# Patient Record
Sex: FEMALE | Race: BLACK OR AFRICAN AMERICAN | Employment: PART TIME | URBAN - METROPOLITAN AREA
[De-identification: names, ages, dates, MRNs, and addresses within clinical notes are randomized per-mention and may not be internally consistent; named-entity substitution may affect disease eponyms.]

---

## 2017-05-10 ENCOUNTER — OFFICE VISIT (OUTPATIENT)
Dept: INTERNAL MEDICINE CLINIC | Age: 57
End: 2017-05-10

## 2017-05-10 VITALS
HEIGHT: 69 IN | BODY MASS INDEX: 23.7 KG/M2 | RESPIRATION RATE: 16 BRPM | WEIGHT: 160 LBS | TEMPERATURE: 98.6 F | HEART RATE: 62 BPM | DIASTOLIC BLOOD PRESSURE: 89 MMHG | OXYGEN SATURATION: 100 % | SYSTOLIC BLOOD PRESSURE: 151 MMHG

## 2017-05-10 DIAGNOSIS — M22.2X2 PATELLOFEMORAL ARTHRALGIA OF BOTH KNEES: Primary | ICD-10-CM

## 2017-05-10 DIAGNOSIS — M22.2X1 PATELLOFEMORAL ARTHRALGIA OF BOTH KNEES: Primary | ICD-10-CM

## 2017-05-10 NOTE — PROGRESS NOTES
Chief Complaint   Patient presents with    Knee Pain     F/u     she is a 60y.o. year old female who presents for follow up of bilateral knee pain. Patient was in office 2/23/16 and since symptoms have improved. PT is still have pain to anterior knee that radiates to the posterior side. Patient currently not taking medication. Follow Up Pain Assessment Encounter      Onset of Symptoms: couple years   ________________________________________________________________________  Description: Pain is now 50% has slightly improved      Pain Scale:(1-10): 5  Duration:  intermittent  What makes it better?: rest  What makes it worse?:walking  Medications tried: ibuprofen  Modalities tried: Exercises        Reviewed and agree with Nurse Note and duplicated in this note. Reviewed PmHx, RxHx, FmHx, SocHx, AllgHx and updated and dated in the chart.     Family History   Problem Relation Age of Onset    Alcohol abuse Father     Liver Disease Father      cirrhosis    Stroke Maternal Grandmother     Prostate Cancer Maternal Grandfather     Heart Disease Mother      Has Debrillator    Hypertension Mother     Pacemaker Mother     Anesth Problems Neg Hx        Past Medical History:   Diagnosis Date    Adult acne     Arthritis     History of colonic polyps     2014 study normal, f/u 5 years Saw [facility])    Hypertension     HISTORY NOT TAKING ANY MEDS CURRENTLY    Right inguinal hernia 3/16/2016    Wears dentures       Social History     Social History    Marital status: SINGLE     Spouse name: N/A    Number of children: N/A    Years of education: N/A     Occupational History   [employer]      Social History Main Topics    Smoking status: Former Smoker     Packs/day: 0.00     Years: 4.00     Types: Cigarettes     Quit date: 1/1/1991    Smokeless tobacco: Never Used      Comment: SMOKED 1 CIGARRETTE DAILY FOR 4 YEARS    Alcohol use No    Drug use: No    Sexual activity: No     Other Topics Concern     Service No    Blood Transfusions No    Caffeine Concern Yes     0-1 cup daily (previously 1-2 per day)    Occupational Exposure No    Sleep Concern Yes     restless sleep    Stress Concern No    Weight Concern No    Special Diet No    Exercise Yes     walks routinely     Social History Narrative    Single, has two children (son and daughter). [facility]. Review of Systems - negative except as listed above      Objective:     Vitals:    05/10/17 1559   BP: 151/89   Pulse: 62   Resp: 16   Temp: 98.6 °F (37 °C)   TempSrc: Oral   SpO2: 100%   Weight: 160 lb (72.6 kg)   Height: 5' 9\" (1.753 m)       Physical Examination: General appearance - alert, well appearing, and in no distress  Back exam - full range of motion, no tenderness, palpable spasm or pain on motion  Neurological - alert, oriented, normal speech, no focal findings or movement disorder noted  Musculoskeletal - bilateral knee exam:  The patient'sbilateral Knee  is  normal to inspection. The ROM is normal and there is flexion to 60 Effusion is: absent The joint exhibits  absent warmth and Crepitus is: moderate. The Stanley test is:  Negative Joint Line Tenderness is  Positive laterally. The [facility] Test is not done   and the Lachman is  negative   . The Anterior Drawer is  Negative. The Posterior Drawer is:  negative. Valgus Stress (for MCL) is:  normal . Varus Stress (for LCL) is  normal  . The Jacqueline Test is negative and the Apprehension Sign:   negative  Extremities - peripheral pulses normal, no pedal edema, no clubbing or cyanosis  Skin - normal coloration and turgor, no rashes, no suspicious skin lesions noted    Assessment/ Plan:   Alice Roa was seen today for knee pain. Diagnoses and all orders for this visit:    Patellofemoral arthralgia of both knees  -     REFERRAL TO PHYSICAL THERAPY     Follow-up Disposition:  Return in about 4 weeks (around 6/7/2017) for bilateral knee pain.     Pathophysiology, recovery and rehabilitation process discussed and questions answered   Counseling for 30 Minutes of the total visit duration   Pictures and figures used as necessary   Provided reassurance   Recommend use of OTC Aleve or Advil   Discussed use and side effects of prescribed medications   Monitor response to Physical Therapy   Recommend activity modification   Recommend  lower impact activities-walking, Eliptical, Nordic Track, cycling or swimming   Follow up in 4 week(s)      I have discussed the diagnosis with the patient and the intended plan as seen in the above orders. The patient has received an after-visit summary and questions were answered concerning future plans. Medication Side Effects and Warnings were discussed with patient: yes  Patient Labs were reviewed and or requested: yes  Patient Past Records were reviewed and or requested  yes  I have discussed the diagnosis with the patient and the intended plan as seen in the above orders. The patient has received an after-visit summary and questions were answered concerning future plans. Pt agrees to call or return to clinic and/or go to closest ER with any worsening of symptoms. This may include, but not limited to increased fever (>100.4) with NSAIDS or Tylenol, increased edema, confusion, rash, worsening of presenting symptoms. Patient was informed/counseled to:    1) Remember to stay active and/or exercise regularly (I suggest 30-45 minutes daily)   2) For reliable dietary information, go to www. EATRIGHT.org. You may wish to consider seeing the nutritionist at [facility] at #[phone] or [phone], also consider the [address]   3) I routinely suggest a complete physical exam once each year (your birth month)

## 2017-05-10 NOTE — MR AVS SNAPSHOT
Visit Information Date & Time Provider Department Dept. Phone Encounter #  
 5/10/2017  3:30 PM Van Juarez MD [facility] and [facility] 34 [id number] Follow-up Instructions Return in about 4 weeks (around 6/7/2017) for bilateral knee pain. Your Appointments 5/24/2017  2:40 PM  
ROUTINE CARE with Jeannie Shin  [facility] [facility] Appt Note: FOLLOW UP VISIT -  $0CP-  $0PB-  [facility]  5-4-2017  
 [address] [place] [address]  
[phone]  
  
   
 [phone]  
  
    
 6/7/2017  3:15 PM  
Any with Van Juarez MD  
[address] [place] [address] and Primary Care [place] [facility]) Appt Note: f/u  
 [address] [facility]  
  
   
 [address] [id number] Upcoming Health Maintenance Date Due DTaP/Tdap/Td series (1 - Tdap) 3/15/2016 PAP AKA CERVICAL CYTOLOGY 7/1/2017 INFLUENZA AGE 9 TO ADULT 8/1/2017 BREAST CANCER SCRN MAMMOGRAM 11/11/2017 COLONOSCOPY 4/18/2024 Allergies as of 5/10/2017  Review Complete On: 5/10/2017 By: Van Juarez MD  
  
 Severity Noted Reaction Type Reactions Pcn [Penicillins]  11/22/2011    Hives  
 itching Current Immunizations  Reviewed on 4/6/2015 Name Date Td, Adsorbed PF 3/14/2016 Not reviewed this visit You Were Diagnosed With   
  
 Codes Comments Patellofemoral arthralgia of both knees    -  Primary ICD-10-CM: M22.2X1, M22.2X2 ICD-9-CM: 719.46 Vitals BP Pulse Temp Resp Height(growth percentile) Weight(growth percentile) 151/89 (BP 1 Location: Right arm, BP Patient Position: Sitting) 62 98.6 °F (37 °C) (Oral) 16 5' 9\" (1.753 m) 160 lb (72.6 kg) SpO2 BMI OB Status Smoking Status 100% 23.63 kg/m2 Hysterectomy Former Smoker BMI and BSA Data  Body Mass Index Body Surface Area  
 23.63 kg/m 2 1.88 m 2  
  
  
 Preferred Pharmacy Pharmacy Name Phone [facility] [address] [facility] [phone] Your Updated Medication List  
  
   
This list is accurate as of: 5/10/17  4:13 PM.  Always use your most recent med list.  
  
  
  
  
 acetaminophen-codeine 300-30 mg per tablet Commonly known as:  TYLENOL #3 Take 1 Tab by mouth every six (6) hours as needed for Pain. Max Daily Amount: 4 Tabs. benzoyl peroxide-erythromycin 3-5 % topical gel Commonly known as:  BENZAMYCIN  
APPLY TO THE AFFECTED AREA TWICE DAILY  
  
 ibuprofen 600 mg tablet Commonly known as:  MOTRIN Take 1 Tab by mouth every six (6) hours as needed for Pain.  
  
 meloxicam 7.5 mg tablet Commonly known as:  MOBIC Take 1 Tab by mouth daily. MULTIVITAMIN PO Take  by mouth daily. potassium chloride 10 mEq tablet Commonly known as:  KLOR-CON Take 1 Tab by mouth daily. TYLENOL EXTRA STRENGTH 500 mg tablet Generic drug:  acetaminophen Take  by mouth every six (6) hours as needed for Pain. We Performed the Following REFERRAL TO PHYSICAL THERAPY [SAK88 Custom] Follow-up Instructions Return in about 4 weeks (around 6/7/2017) for bilateral knee pain. Referral Information Referral ID Referred By Referred To  
  
 [id number] [facility] OP PT SRIRAM   
   [address] [place] [address], [address] Phone: [phone] Fax: [phone] Visits Status Start Date End Date  
 21 New Request 5/10/17 5/10/18 If your referral has a status of pending review or denied, additional information will be sent to support the outcome of this decision. Introducing [facility]! Mere Woodward introduces [facility] patient portal. Now you can access parts of your medical record, email your doctor's office, and request medication refills online.    
 
1. In your internet browser, go to [email or web address] 2. Click on the First Time User? Click Here link in the Sign In box. You will see the New Member Sign Up page. 3. Enter your CodeSquare Access Code exactly as it appears below. You will not need to use this code after youve completed the sign-up process. If you do not sign up before the expiration date, you must request a new code. · CodeSquare Access Code: [email or web address] Expires: 8/8/2017  4:11 PM 
 
4. Enter the last four digits of your Social Security Number (xxxx) and Date of Birth (mm/dd/yyyy) as indicated and click Submit. You will be taken to the next sign-up page. 5. Create a Utterzt ID. This will be your CodeSquare login ID and cannot be changed, so think of one that is secure and easy to remember. 6. Create a CodeSquare password. You can change your password at any time. 7. Enter your Password Reset Question and Answer. This can be used at a later time if you forget your password. 8. Enter your e-mail address. You will receive e-mail notification when new information is available in [address] 9. Click Sign Up. You can now view and download portions of your medical record. 10. Click the Download Summary menu link to download a portable copy of your medical information. If you have questions, please visit the Frequently Asked Questions section of the CodeSquare website. Remember, CodeSquare is NOT to be used for urgent needs. For medical emergencies, dial 911. Now available from your iPhone and Android! Please provide this summary of care documentation to your next provider. Your primary care clinician is listed as Evon Nguyễn. If you have any questions after today's visit, please call [phone].

## 2017-05-15 ENCOUNTER — APPOINTMENT (OUTPATIENT)
Dept: PHYSICAL THERAPY | Age: 57
End: 2017-05-15

## 2017-05-31 ENCOUNTER — HOSPITAL ENCOUNTER (OUTPATIENT)
Dept: PHYSICAL THERAPY | Age: 57
Discharge: HOME | End: 2017-05-31
Payer: SUBSIDIZED

## 2017-05-31 PROCEDURE — 97110 THERAPEUTIC EXERCISES: CPT | Performed by: PHYSICAL THERAPIST

## 2017-05-31 PROCEDURE — 97161 PT EVAL LOW COMPLEX 20 MIN: CPT | Performed by: PHYSICAL THERAPIST

## 2017-05-31 NOTE — PROGRESS NOTES
PT INITIAL EVALUATION NOTE 2-15    Patient Name: Sarah Oates  Date:2017  : 1960  [x]  Patient  Verified  Payor: Woody Carpenter / Plan: [facility] % / Product Type: Xiomara /    In time:3:40p  Out time:4:30p  Total Treatment Time (min): 50  Visit #: 1    Treatment Area: Pain in right knee [M25.561]    SUBJECTIVE  Pain Level (0-10 scale): 8/10  Any medication changes, allergies to medications, adverse drug reactions, diagnosis change, or new procedure performed?: [] No    [x] Yes (see summary sheet for update)  Subjective: The pt presents with bilateral knee pain that is constant in nature and reported at 8/10 pain. Pain has been going on for 5 years. They ache and he had to drain knees last year. The pt has not been to PT. Outside walking is exercise and walks daily. The pt can walk 5-6 blocks at this time. Gets stiff in the morning or at the end of the day. Stairs are challengeing but feels like they are exercising her knee. PLOF: activities  Mechanism of Injury: gradual onset  Previous Treatment/Compliance: fluid removal  PMHx/Surgical Hx: HTN  Work Hx:not currently working  Living Situation:  Steps at home. Lives with family  Pt Goals: decreased pain  Barriers: chronicity of pain  Motivation: high  Substance use: none  FABQ Score:26  Cognition: A & O x4        OBJECTIVE/EXAMINATION      Gait and Functional Mobility: mild Antalgic gait   Palpation: Decreased PFJ mobility with crepitus R > L     Knee ROM:   R   L   Flexion   132   130   Extension  0   0        Joint Mobility Assessment: Restricted PFJ mobility noted, lateral tilt and tightness in ITB. Flexibility:Decreased gastroc, HS and quad flexibility. ITB tightness bilaterally.        LOWER QUARTER   MUSCLE STRENGTH  KEY       R  L  0 - No Contraction  Knee ext  4  4  1 - Trace            flex  4  4  2 - Poor   Hip ext   -  -  3 - Fair          flex   4  4  4 - Good         abd  4  4  5 - Normal         add  4  4        Special Tests: Trendelenberg: positive   Stork: n/t      Naman: positive    Jacqueline: postive                 H.S. SLR: positive                 Modality rationale: decrease inflammation and decrease pain to improve the patients ability to progress daily activities   Min Type Additional Details    [] Estim: []Att   []Unatt        []TENS instruct                  []IFC  []Premod   []NMES                     []Other:  []w/US   []w/ice   []w/heat  Position:  Location:    []  Traction: [] Cervical       []Lumbar                       [] Prone          []Supine                       []Intermittent   []Continuous Lbs:  [] before manual  [] after manual  []w/heat    []  Ultrasound: []Continuous   [] Pulsed at:                            []1MHz   []3MHz Location:  W/cm2:    []  Paraffin         Location:  []w/heat   10 [x]  Ice     []  Heat  []  Ice massage Position: sitting  Location: knee bilateral    []  Laser  []  Other: Position:  Location:    []  Vasopneumatic Device Pressure:       [] lo [] med [] hi   Temperature:    [x] Skin assessment post-treatment:  [x]intact []redness- no adverse reaction    []redness  adverse reaction:     15 min Therapeutic Exercise:  [x] See flow sheet : quad sets, SLR flexion with assistance, calf, HS stretching and heel slides. Rationale: increase ROM, increase strength, improve coordination, improve balance and increase proprioception to improve the patients ability to daily activity tolerance.              With   [] TE   [] TA   [] neuro   [] other: Patient Education: [x] Review HEP    [] Progressed/Changed HEP based on:   [] positioning   [] body mechanics   [] transfers   [] heat/ice application    [] other:        Other Objective/Functional Measures: FOTO 55  Pain Level (0-10 scale) post treatment: 5/10      ASSESSMENT:      [x]  See Plan of Alex BEAN Matias Huntley 5/31/2017  3:39 PM

## 2017-05-31 NOTE — PROGRESS NOTES
[facility]  [address] [address] [place], [address] [place] [address]  Phone: [phone]  Fax: [phone]    Plan of Care/Statement of Necessity for Physical Therapy Services  2-15    Patient name: Rebecca Hickey  : 1960  Provider#: [id number]  Referral source: Bennett Li MD      Medical/Treatment Diagnosis: Pain in right knee [M25.561]     Prior Hospitalization: see medical history     Comorbidities: HTN, OA  Prior Level of Function: walking daily 5-6 blocks  Medications: Verified on Patient Summary List    Start of Care:17      Onset Date: 5 years +      The Plan of Care and following information is based on the information from the initial evaluation. Assessment/ key information: The pt is a 60 y.o. Female referred for the evaluation and treatment of bilateral PFJ arthritis and knee pain. The pt presents with s/s consistent with referring dx with decreased strength, flexibility, balance and proprioception. She has delayed quad firing and tightness in her ITB that contributes to PFJ tracking and pain. The pt would benefit from skilled physical therapy in order to address these impairments and to return her  to maximal level of function pain free.      Evaluation Complexity History MEDIUM  Complexity : 1-2 comorbidities / personal factors will impact the outcome/ POC ; Examination MEDIUM Complexity : 3 Standardized tests and measures addressing body structure, function, activity limitation and / or participation in recreation  ;Presentation LOW Complexity : Stable, uncomplicated  ;Clinical Decision Making MEDIUM Complexity : FOTO score of 26-74  Overall Complexity Rating: LOW     Problem List: pain affecting function, decrease ROM, decrease strength, edema affecting function, impaired gait/ balance, decrease ADL/ functional abilitiies, decrease activity tolerance and decrease flexibility/ joint mobility   Treatment Plan may include any combination of the following: Therapeutic exercise, Therapeutic activities, Neuromuscular re-education, Physical agent/modality, Gait/balance training, Manual therapy, Patient education and Functional mobility training  Patient / Family readiness to learn indicated by: asking questions, trying to perform skills and interest  Persons(s) to be included in education: patient (P)  Barriers to Learning/Limitations: None  Patient Goal (s): decrease pain in knees  Patient Self Reported Health Status: good  Rehabilitation Potential: good    Short Term Goals: To be accomplished in 2 weeks:  1)Pt will be Independent with HEP. 2) Pt will be able to complete 10 SLR into flexion. Long Term Goals: To be accomplished in 6-8 weeks:  1)Pt will be able to Navigate one flight of stairs without pain. 2) Pt will be able to Stand for >/= 20 minutes without pain. 3) Pt will be able to Ambulate >/= 10 blocks   4) Pt will be able to perform 20 SLR into flexion    Frequency / Duration: Patient to be seen 2 times per week for 6-8 weeks. Patient/ Caregiver education and instruction: activity modification and exercises    [x]  Plan of care has been reviewed with ARNALDO Dee 5/31/2017 6:04 PM    ________________________________________________________________________    I certify that the above Therapy Services are being furnished while the patient is under my care. I agree with the treatment plan and certify that this therapy is necessary.     [de-identified] Signature:____________________  Date:____________Time: _________

## 2017-06-06 ENCOUNTER — APPOINTMENT (OUTPATIENT)
Dept: PHYSICAL THERAPY | Age: 57
End: 2017-06-06
Payer: SUBSIDIZED

## 2017-06-07 ENCOUNTER — HOSPITAL ENCOUNTER (OUTPATIENT)
Dept: PHYSICAL THERAPY | Age: 57
Discharge: HOME | End: 2017-06-07
Payer: SUBSIDIZED

## 2017-06-07 PROCEDURE — 97110 THERAPEUTIC EXERCISES: CPT

## 2017-06-07 PROCEDURE — 97140 MANUAL THERAPY 1/> REGIONS: CPT

## 2017-06-07 NOTE — PROGRESS NOTES
PT DAILY TREATMENT NOTE - [facility] [address]    Patient Name: Epifanio George  Date:2017  : 1960  [x]  Patient  Verified  Payor: Susana Min / Plan: [facility] % / Product Type: [employer] /    In time: 5:55P  Out time:6:55P  Total Treatment Time (min): 60  Total Timed Codes (min): 50  1:1 Treatment Time ( only): --   Visit #: 2     Treatment Area: Pain in right knee [M25.561]    SUBJECTIVE  Pain Level (0-10 scale): 7/10  Any medication changes, allergies to medications, adverse drug reactions, diagnosis change, or new procedure performed?: [x] No    [] Yes (see summary sheet for update)  Subjective functional status/changes:   [] No changes reported  \"I am about the same since I was here last. I have been doing my HEP.     OBJECTIVE    Modality rationale: decrease edema, decrease inflammation and decrease pain to improve the patients ability to decrease B knee swelling   Min Type Additional Details    [] Estim: []Att   []Unatt        []TENS instruct                  []IFC  []Premod   []NMES                     []Other:  []w/US   []w/ice   []w/heat  Position:  Location:    []  Traction: [] Cervical       []Lumbar                       [] Prone          []Supine                       []Intermittent   []Continuous Lbs:  [] before manual  [] after manual  []w/heat    []  Ultrasound: []Continuous   [] Pulsed at:                           []1MHz   []3MHz Location:  W/cm2:    [] Paraffin         Location:   []w/heat   10 post [x]  Ice     []  Heat  []  Ice massage Position: supine with bolster  Location: B knees    []  Laser  []  Other: Position:  Location:      []  Vasopneumatic Device Pressure:       [] lo [] med [] hi   Temperature:      [x] Skin assessment post-treatment:  [x]intact []redness- no adverse reaction    []redness  adverse reaction:     35 min Therapeutic Exercise:  [x] See flow sheet :   Rationale: increase ROM, increase strength and improve coordination to improve the patients ability to increase ability for ambulation    15 min Manual Therapy: B patella mobs inf/sup, FR B ITB    Rationale: decrease pain, increase ROM, increase tissue extensibility and decrease trigger points to improve the patients ability to increase knee mobility          With   [] TE   [] TA   [] neuro   [] other: Patient Education: [x] Review HEP    [] Progressed/Changed HEP based on:   [] positioning   [] body mechanics   [] transfers   [] heat/ice application    [] other:      Other Objective/Functional Measures: --     Pain Level (0-10 scale) post treatment: 5/10      ASSESSMENT/Changes in Function:   Pt tolerated increase in strengthening without increase in B knee pain. However, pt reported increase fatigue with strengthening exercises. Continue to progress as tolerated. Patient will continue to benefit from skilled PT services to modify and progress therapeutic interventions, address functional mobility deficits, address ROM deficits, address strength deficits, analyze and address soft tissue restrictions, analyze and cue movement patterns and analyze and modify body mechanics/ergonomics to attain remaining goals. [x]  See Plan of Care  []  See progress note/recertification  []  See Discharge Summary         Progress towards goals / Updated goals:  Short Term Goals: To be accomplished in 2 weeks:  1)Pt will be Independent with HEP. 2) Pt will be able to complete 10 SLR into flexion.      Long Term Goals: To be accomplished in 6-8 weeks:  1)Pt will be able to Navigate one flight of stairs without pain. 2) Pt will be able to Stand for >/= 20 minutes without pain.    3) Pt will be able to Ambulate >/= 10 blocks   4) Pt will be able to perform 20 SLR into flexion    PLAN  [x]  Upgrade activities as tolerated     [x]  Continue plan of care  []  Update interventions per flow sheet       []  Discharge due to:_  []  Other:_      Claudia Morel PTA 6/7/2017  6:00 PM

## 2017-06-13 ENCOUNTER — APPOINTMENT (OUTPATIENT)
Dept: PHYSICAL THERAPY | Age: 57
End: 2017-06-13
Payer: SUBSIDIZED

## 2017-06-19 ENCOUNTER — HOSPITAL ENCOUNTER (OUTPATIENT)
Dept: PHYSICAL THERAPY | Age: 57
Discharge: HOME | End: 2017-06-19
Payer: SUBSIDIZED

## 2017-06-19 PROCEDURE — 97110 THERAPEUTIC EXERCISES: CPT

## 2017-06-19 NOTE — PROGRESS NOTES
PT DAILY TREATMENT NOTE - [facility] 2-15    Patient Name: Mane Peace  Date:2017  : 1960  [x]  Patient  Verified  Payor: Levar Pedroza / Plan: [employer] % / Product Type: [employer] /    In time: 5:25P  Out time: 6:25P  Total Treatment Time (min): 60  Total Timed Codes (min): 50  1:1 Treatment Time ( only): --   Visit #: 3     Treatment Area: Pain in right knee [M25.561]    SUBJECTIVE  Pain Level (0-10 scale): 0/10  Any medication changes, allergies to medications, adverse drug reactions, diagnosis change, or new procedure performed?: [x] No    [] Yes (see summary sheet for update)  Subjective functional status/changes:   [] No changes reported  \"I am having no pain in either knee. I am finding that doing the things to provoke pain are not as bad as they used to be like climbing stairs. \"    OBJECTIVE    Modality rationale: decrease edema, decrease inflammation and decrease pain to improve the patients ability to decrease B knee swelling   Min Type Additional Details    [] Estim: []Att   []Unatt        []TENS instruct                  []IFC  []Premod   []NMES                     []Other:  []w/US   []w/ice   []w/heat  Position:  Location:    []  Traction: [] Cervical       []Lumbar                       [] Prone          []Supine                       []Intermittent   []Continuous Lbs:  [] before manual  [] after manual  []w/heat    []  Ultrasound: []Continuous   [] Pulsed at:                           []1MHz   []3MHz Location:  W/cm2:    [] Paraffin         Location:   []w/heat   10 post [x]  Ice     []  Heat  []  Ice massage Position: supine with bolster  Location: B knees    []  Laser  []  Other: Position:  Location:      []  Vasopneumatic Device Pressure:       [] lo [] med [] hi   Temperature:      [x] Skin assessment post-treatment:  [x]intact []redness- no adverse reaction    []redness  adverse reaction:     50 min Therapeutic Exercise:  [x] See flow sheet :   Rationale: increase ROM, increase strength and improve coordination to improve the patients ability to increase ability for ambulation    With   [] TE   [] TA   [] neuro   [] other: Patient Education: [x] Review HEP    [] Progressed/Changed HEP based on:   [] positioning   [] body mechanics   [] transfers   [] heat/ice application    [] other:      Other Objective/Functional Measures: --     Pain Level (0-10 scale) post treatment: 0/10      ASSESSMENT/Changes in Function:   Pt is making progress towards STG and LTG set forth by the PT. Held on manual therapy today secondary of pt not having any pain in either knee. Continue to progress quad and xin strengthening need for daily activities  Patient will continue to benefit from skilled PT services to modify and progress therapeutic interventions, address functional mobility deficits, address ROM deficits, address strength deficits, analyze and address soft tissue restrictions, analyze and cue movement patterns and analyze and modify body mechanics/ergonomics to attain remaining goals. [x]  See Plan of Care  []  See progress note/recertification  []  See Discharge Summary         Progress towards goals / Updated goals:  Short Term Goals: To be accomplished in 2 weeks:  1)Pt will be Independent with HEP. MET  2) Pt will be able to complete 10 SLR into flexion.      Long Term Goals: To be accomplished in 6-8 weeks:  1)Pt will be able to Navigate one flight of stairs without pain. Progressing  2) Pt will be able to Stand for >/= 20 minutes without pain.   Progressing  3) Pt will be able to Ambulate >/= 10 blocks  Progressing  4) Pt will be able to perform 20 SLR into flexion    PLAN  [x]  Upgrade activities as tolerated     [x]  Continue plan of care  []  Update interventions per flow sheet       []  Discharge due to:_  []  Other:_      Rosa Elena Griffin, ARNALDO 6/19/2017  6:00 PM

## 2017-06-20 ENCOUNTER — APPOINTMENT (OUTPATIENT)
Dept: PHYSICAL THERAPY | Age: 57
End: 2017-06-20
Payer: SUBSIDIZED

## 2017-06-21 ENCOUNTER — HOSPITAL ENCOUNTER (OUTPATIENT)
Dept: PHYSICAL THERAPY | Age: 57
Discharge: HOME | End: 2017-06-21
Payer: SUBSIDIZED

## 2017-06-21 PROCEDURE — 97110 THERAPEUTIC EXERCISES: CPT | Performed by: PHYSICAL THERAPIST

## 2017-06-21 PROCEDURE — 97140 MANUAL THERAPY 1/> REGIONS: CPT | Performed by: PHYSICAL THERAPIST

## 2017-06-21 NOTE — PROGRESS NOTES
PT DAILY TREATMENT NOTE 2-15    Patient Name: Chris Holcomb  Date:2017  : 1960  [x]  Patient  Verified  Payor: Sahra Paty / Plan: [employer] % / Product Type: [id number] /    In time: 5:10p Out time:6:20p  Total Treatment Time (min): 70  Visit #: 4    Treatment Area: Pain in right knee [M25.561]    SUBJECTIVE  Pain Level (0-10 scale): 6/10  Any medication changes, allergies to medications, adverse drug reactions, diagnosis change, or new procedure performed?: [x] No    [] Yes (see summary sheet for update)  Subjective functional status/changes:   [] No changes reported  Left knee is feeling more  Sore today.      OBJECTIVE           Modality rationale: decrease edema, decrease inflammation and decrease pain to improve the patients ability to decrease B knee swelling   Min Type Additional Details     [] Estim: []Att   []Unatt        []TENS instruct                  []IFC  []Premod   []NMES                     []Other:  []w/US   []w/ice   []w/heat  Position:  Location:     []  Traction: [] Cervical       []Lumbar                       [] Prone          []Supine                       []Intermittent   []Continuous Lbs:  [] before manual  [] after manual  []w/heat     []  Ultrasound: []Continuous   [] Pulsed at:                           []1MHz   []3MHz Location:  W/cm2:     [] Paraffin      Location:   []w/heat   10 post [x]  Ice     []  Heat  []  Ice massage Position: supine with bolster  Location: B knees     []  Laser  []  Other: Position:  Location:     []  Vasopneumatic Device Pressure:       [] lo [] med [] hi   Temperature:       [x] Skin assessment post-treatment:  [x]intact []redness- no adverse reaction    []redness  adverse reaction:      50 min Therapeutic Exercise:  [x] See flow sheet :   Rationale: increase ROM, increase strength and improve coordination to improve the patients ability to increase ability for ambulation      10 min Manual Therapy: B patella mobs inf/sup, FR B ITB Rationale: decrease pain, increase ROM, increase tissue extensibility and decrease trigger points to improve the patients ability to increase knee mobility        With   [] TE   [] TA   [] neuro   [] other: Patient Education: [x] Review HEP    [] Progressed/Changed HEP based on:   [] positioning   [] body mechanics   [] transfers   [] heat/ice application    [] other:       Other Objective/Functional Measures: --                  Pain Level (0-10 scale) post treatment: 0/10       ASSESSMENT/Changes in Function:   Pt is progressing with exercises and able to add SLS and heel raises. Progressed with TKE to cable column. She had more pain in her left knee walking into PT today. Performed PFJ mobilizations and ITB release. She was tighter on the left knee today with greater amount of pain. Patient will continue to benefit from skilled PT services to modify and progress therapeutic interventions, address functional mobility deficits, address ROM deficits, address strength deficits, analyze and address soft tissue restrictions, analyze and cue movement patterns and analyze and modify body mechanics/ergonomics to attain remaining goals.      [x]  See Plan of Care  []  See progress note/recertification  []  See Discharge Summary      Progress towards goals / Updated goals:  Short Term Goals: To be accomplished in 2 weeks:  1)Pt will be Independent with HEP. MET  2) Pt will be able to complete 10 SLR into flexion.       Long Term Goals: To be accomplished in 6-8 weeks:  1)Pt will be able to Navigate one flight of stairs without pain. Progressing  2) Pt will be able to Stand for >/= 20 minutes without pain.   Progressing  3) Pt will be able to Ambulate >/= 10 blocks  Progressing  4) Pt will be able to perform 20 SLR into flexion     PLAN  [x]  Upgrade activities as tolerated     [x]  Continue plan of care  []  Update interventions per flow sheet       []  Discharge due to:_  []  Other:_      Santosh Long 6/21/2017  5:47 PM

## 2017-06-23 ENCOUNTER — APPOINTMENT (OUTPATIENT)
Dept: PHYSICAL THERAPY | Age: 57
End: 2017-06-23
Payer: SUBSIDIZED

## 2017-06-26 ENCOUNTER — HOSPITAL ENCOUNTER (OUTPATIENT)
Dept: PHYSICAL THERAPY | Age: 57
Discharge: HOME | End: 2017-06-26
Payer: SUBSIDIZED

## 2017-06-26 PROCEDURE — 97140 MANUAL THERAPY 1/> REGIONS: CPT

## 2017-06-26 PROCEDURE — 97110 THERAPEUTIC EXERCISES: CPT

## 2017-06-26 NOTE — PROGRESS NOTES
PT DAILY TREATMENT NOTE - [facility] 2-15    Patient Name: Shirin Draper  Date:2017  : 1960  [x]  Patient  Verified  Payor: Liv Saldana / Plan: [facility] % / Product Type: [place] /    In time:5:30P  Out time:6:35P  Total Treatment Time (min): 65  Total Timed Codes (min): 65  1:1 Treatment Time ( only): --   Visit #: 5     Treatment Area: Pain in right knee [M25.561]    SUBJECTIVE  Pain Level (0-10 scale): 0/10  Any medication changes, allergies to medications, adverse drug reactions, diagnosis change, or new procedure performed?: [x] No    [] Yes (see summary sheet for update)  Subjective functional status/changes:   [] No changes reported  \"I am feeling good today. The soreness I had last time is gone. \"    OBJECTIVE    50 min Therapeutic Exercise:  [x] See flow sheet :   Rationale: increase ROM, increase strength and improve coordination to improve the patients ability for ambulation    15 min Manual Therapy: B patella mobs inf/sup, FR B ITB     Rationale: decrease pain, increase ROM, increase tissue extensibility and decrease trigger points to improve the patients ability to increase knee mobility    With   [] TE   [] TA   [] neuro   [] other: Patient Education: [x] Review HEP    [] Progressed/Changed HEP based on:   [] positioning   [] body mechanics   [] transfers   [] heat/ice application    [] other:      Other Objective/Functional Measures: --     Pain Level (0-10 scale) post treatment: 0/10    ASSESSMENT/Changes in Function:   Able to advance strengthening exercises today without increase in B knee pain. Continue to progress as tolerated. Patient will continue to benefit from skilled PT services to modify and progress therapeutic interventions, address functional mobility deficits, address ROM deficits, address strength deficits, analyze and address soft tissue restrictions and analyze and cue movement patterns to attain remaining goals.      [x]  See Plan of Care  []  See progress note/recertification  []  See Discharge Summary         Progress towards goals / Updated goals:  Short Term Goals: To be accomplished in 2 weeks:  1)Pt will be Independent with HEP.  MET  2) Pt will be able to complete 10 SLR into flexion.       Long Term Goals: To be accomplished in 6-8 weeks:  1)Pt will be able to Navigate one flight of stairs without pain.  Progressing  2) Pt will be able to Stand for >/= 20 minutes without pain.  Progressing  3) Pt will be able to Ambulate >/= 10 blocks  Progressing  4) Pt will be able to perform 20 SLR into flexion    PLAN  [x]  Upgrade activities as tolerated     [x]  Continue plan of care  []  Update interventions per flow sheet       []  Discharge due to:_  []  Other:_      Mee Lofton PTA 6/26/2017  5:36 PM

## 2017-06-27 ENCOUNTER — APPOINTMENT (OUTPATIENT)
Dept: PHYSICAL THERAPY | Age: 57
End: 2017-06-27
Payer: SUBSIDIZED

## 2017-06-28 ENCOUNTER — HOSPITAL ENCOUNTER (OUTPATIENT)
Dept: PHYSICAL THERAPY | Age: 57
Discharge: HOME | End: 2017-06-28
Payer: SUBSIDIZED

## 2017-06-28 PROCEDURE — 97110 THERAPEUTIC EXERCISES: CPT | Performed by: PHYSICAL THERAPIST

## 2017-06-28 PROCEDURE — 97140 MANUAL THERAPY 1/> REGIONS: CPT | Performed by: PHYSICAL THERAPIST

## 2017-06-28 NOTE — PROGRESS NOTES
PT DAILY TREATMENT NOTE 2-15    Patient Name: Alvaro Carlson  Date:2017  : 1960  [x]  Patient  Verified  Payor: Josh Cook / Plan: [facility] % / Product Type: [id number] /    In time: 5:00p  Out time: 6:00p  Total Treatment Time (min): 60  Visit #: 6    Treatment Area: Pain in right knee [M25.561]    SUBJECTIVE  Pain Level (0-10 scale): 0/10  Any medication changes, allergies to medications, adverse drug reactions, diagnosis change, or new procedure performed?: [x] No    [] Yes (see summary sheet for update)  Subjective functional status/changes:   [] No changes reported  Doing well, no increased pain. OBJECTIVE     50 min Therapeutic Exercise:  [x] See flow sheet :   Rationale: increase ROM, increase strength and improve coordination to improve the patients ability for ambulation     10 min Manual Therapy: B patella mobs inf/sup, STM B ITB     Rationale: decrease pain, increase ROM, increase tissue extensibility and decrease trigger points to improve the patients ability to increase knee mobility     With   [] TE   [] TA   [] neuro   [] other: Patient Education: [x] Review HEP    [] Progressed/Changed HEP based on:   [] positioning   [] body mechanics   [] transfers   [] heat/ice application    [] other:       Other Objective/Functional Measures: --                  Pain Level (0-10 scale) post treatment: 0/10     ASSESSMENT/Changes in Function:   Pt feeling good with her progress. Still had palpable tightness sin distal ITB, but overall feeling much improved.    Patient will continue to benefit from skilled PT services to modify and progress therapeutic interventions, address functional mobility deficits, address ROM deficits, address strength deficits, analyze and address soft tissue restrictions and analyze and cue movement patterns to attain remaining goals.      [x]  See Plan of Care  []  See progress note/recertification  []  See Discharge Summary      Progress towards goals / Updated goals:  Short Term Goals: To be accomplished in 2 weeks:  1)Pt will be Independent with HEP.  MET  2) Pt will be able to complete 10 SLR into flexion.       Long Term Goals: To be accomplished in 6-8 weeks:  1)Pt will be able to Navigate one flight of stairs without pain.  Progressing  2) Pt will be able to Stand for >/= 20 minutes without pain.  Progressing  3) Pt will be able to Ambulate >/= 10 blocks  Progressing  4) Pt will be able to perform 20 SLR into flexion     PLAN  [x]  Upgrade activities as tolerated     [x]  Continue plan of care  []  Update interventions per flow sheet       []  Discharge due to:_    Davin Daily 6/28/2017  6:33 PM

## 2017-06-30 ENCOUNTER — APPOINTMENT (OUTPATIENT)
Dept: PHYSICAL THERAPY | Age: 57
End: 2017-06-30
Payer: SUBSIDIZED

## 2017-07-06 ENCOUNTER — APPOINTMENT (OUTPATIENT)
Dept: PHYSICAL THERAPY | Age: 57
End: 2017-07-06
Payer: SUBSIDIZED

## 2017-07-11 ENCOUNTER — HOSPITAL ENCOUNTER (OUTPATIENT)
Dept: PHYSICAL THERAPY | Age: 57
Discharge: HOME | End: 2017-07-11
Payer: SUBSIDIZED

## 2017-07-11 PROCEDURE — 97110 THERAPEUTIC EXERCISES: CPT

## 2017-07-11 NOTE — PROGRESS NOTES
PT DAILY TREATMENT NOTE - [facility] 2-15    Patient Name: Shar Pickett  Date:2017  : 1960  [x]  Patient  Verified  Payor: Liz Pastures / [facility] / Product Type: Heydi Beers /    In time: 3:30P  Out time: 4:10P  Total Treatment Time (min): 40  Total Timed Codes (min): 40  1:1 Treatment Time ( only): --   Visit #: 7     Treatment Area: Pain in right knee [M25.561]    SUBJECTIVE  Pain Level (0-10 scale): 0/10  Any medication changes, allergies to medications, adverse drug reactions, diagnosis change, or new procedure performed?: [x] No    [] Yes (see summary sheet for update)  Subjective functional status/changes:   [] No changes reported  \"I have been feeling good. My biggest complaint. OBJECTIVE    40 min Therapeutic Exercise:  [x] See flow sheet :   Rationale: increase ROM, increase strength and improve coordination to improve the patients ability to tolerate daily activities. With   [] TE   [] TA   [] neuro   [] other: Patient Education: [x] Review HEP    [] Progressed/Changed HEP based on:   [] positioning   [] body mechanics   [] transfers   [] heat/ice application    [] other:      Other Objective/Functional Measures: --     Pain Level (0-10 scale) post treatment: 0/10    ASSESSMENT/Changes in Function:   Pt stated she felt good and did not feel she needed manual or ice today. Will reassess on Thursday. Patient will continue to benefit from skilled PT services to modify and progress therapeutic interventions, address functional mobility deficits, address ROM deficits, address strength deficits, analyze and address soft tissue restrictions, analyze and cue movement patterns and analyze and modify body mechanics/ergonomics to attain remaining goals.      [x]  See Plan of Care  []  See progress note/recertification  []  See Discharge Summary         Progress towards goals / Updated goals:  Short Term Goals: To be accomplished in 2 weeks:  1)Pt will be Independent with HEP.  MET  2) Pt will be able to complete 10 SLR into flexion.       Long Term Goals: To be accomplished in 6-8 weeks:  1)Pt will be able to Navigate one flight of stairs without pain.  Progressing  2) Pt will be able to Stand for >/= 20 minutes without pain.  Progressing  3) Pt will be able to Ambulate >/= 10 blocks  Progressing  4) Pt will be able to perform 20 SLR into flexion    PLAN  [x]  Upgrade activities as tolerated     [x]  Continue plan of care  []  Update interventions per flow sheet       []  Discharge due to:_  []  Other:_      Blaze Mora PTA 7/11/2017  3:45 PM

## 2017-07-13 ENCOUNTER — APPOINTMENT (OUTPATIENT)
Dept: PHYSICAL THERAPY | Age: 57
End: 2017-07-13
Payer: SUBSIDIZED

## 2017-07-20 ENCOUNTER — APPOINTMENT (OUTPATIENT)
Dept: PHYSICAL THERAPY | Age: 57
End: 2017-07-20
Payer: SUBSIDIZED

## 2017-07-25 ENCOUNTER — APPOINTMENT (OUTPATIENT)
Dept: PHYSICAL THERAPY | Age: 57
End: 2017-07-25
Payer: SUBSIDIZED

## 2017-07-27 ENCOUNTER — OFFICE VISIT (OUTPATIENT)
Dept: INTERNAL MEDICINE CLINIC | Age: 57
End: 2017-07-27

## 2017-07-27 VITALS
DIASTOLIC BLOOD PRESSURE: 81 MMHG | WEIGHT: 155 LBS | OXYGEN SATURATION: 99 % | SYSTOLIC BLOOD PRESSURE: 131 MMHG | RESPIRATION RATE: 14 BRPM | HEART RATE: 57 BPM | HEIGHT: 69 IN | BODY MASS INDEX: 22.96 KG/M2

## 2017-07-27 DIAGNOSIS — M17.10 PATELLOFEMORAL ARTHRITIS: Primary | ICD-10-CM

## 2017-07-27 RX ORDER — TRIAMCINOLONE ACETONIDE 40 MG/ML
40 INJECTION, SUSPENSION INTRA-ARTICULAR; INTRAMUSCULAR ONCE
Qty: 1 ML | Refills: 0
Start: 2017-07-27 | End: 2017-07-27

## 2017-07-27 NOTE — PROGRESS NOTES
Chief Complaint   Patient presents with    Knee Pain     Bilateral      she is a 59y.o. year old female who presents for follow up of bilateral knee pain. States she feel that the pain has improved, but still experiences a continuous dull achy pain in both knees. Feels pain has improved with physical therapy and home exercises recommended per physical therapist.      Follow Up Pain Assessment Encounter      Onset of Symptoms: couple of years  ________________________________________________________________________  Description: Pain is now 60% has slightly improved      Pain Scale:(1-10): 5  Duration:  Continuous, consistent dull achy pain  What makes it better?: ice and rest  What makes it worse?:None  Medications tried: acetaminophen, ibuprofen  Modalities tried: Physical therapy and home exercises        Reviewed and agree with Nurse Note and duplicated in this note. Reviewed PmHx, RxHx, FmHx, SocHx, AllgHx and updated and dated in the chart.     Family History   Problem Relation Age of Onset    Alcohol abuse Father     Liver Disease Father      cirrhosis    Stroke Maternal Grandmother     Prostate Cancer Maternal Grandfather     Heart Disease Mother      Has Debrillator    Hypertension Mother     Pacemaker Mother     Anesth Problems Neg Hx        Past Medical History:   Diagnosis Date    Adult acne     Arthritis     History of colonic polyps     2014 study normal, f/u 5 years Virgil Leisure)    Hypertension     HISTORY NOT TAKING ANY MEDS CURRENTLY    Right inguinal hernia 3/16/2016    Wears dentures       Social History     Social History    Marital status: SINGLE     Spouse name: N/A    Number of children: N/A    Years of education: N/A     Occupational History   [employer]      Social History Main Topics    Smoking status: Former Smoker     Packs/day: 0.00     Years: 4.00     Types: Cigarettes     Quit date: 1/1/1991    Smokeless tobacco: Never Used      Comment: SMOKED 1 CIGARRETTE DAILY FOR 4 YEARS    Alcohol use No    Drug use: No    Sexual activity: No     Other Topics Concern     Service No    Blood Transfusions No    Caffeine Concern Yes     0-1 cup daily (previously 1-2 per day)    Occupational Exposure No    Sleep Concern Yes     restless sleep    Stress Concern No    Weight Concern No    Special Diet No    Exercise Yes     walks routinely     Social History Narrative    Single, has two children (son and daughter). [facility]. Review of Systems - negative except as listed above      Objective:     Vitals:    07/27/17 1548   BP: 131/81   Pulse: (!) 57   Resp: 14   SpO2: 99%   Weight: 155 lb (70.3 kg)   Height: 5' 9\" (1.753 m)       Physical Examination: General appearance - alert, well appearing, and in no distress  Back exam - full range of motion, no tenderness, palpable spasm or pain on motion  Neurological - alert, oriented, normal speech, no focal findings or movement disorder noted  Musculoskeletal - bilateral knee exam:  The patient'sbilateral Knee  is  normal to inspection. The ROM is normal and there is flexion to 60 Effusion is: absent The joint exhibits  absent warmth and Crepitus is: moderate. The Stanley test is:  Negative Joint Line Tenderness is  Positive laterally. The [facility] Test is not done   and the Lachman is  negative   . The Anterior Drawer is  Negative. The Posterior Drawer is:  negative.  Valgus Stress (for MCL) is:  normal . Varus Stress (for LCL) is  normal  . The Jacqueline Test is negative and the Apprehension Sign:   negative  Extremities - peripheral pulses normal, no pedal edema, no clubbing or cyanosis  Skin - normal coloration and turgor, no rashes, no suspicious skin lesions noted  Extremities - peripheral pulses normal, no pedal edema, no clubbing or cyanosis  Skin - normal coloration and turgor, no rashes, no suspicious skin lesions noted  Time Out taken at:  4:18 PM  7/27/2017    * Patient was identified by name and date of birth   * Agreement on procedure being performed was verified  * Risks and Benefits explained to the patient  * Procedure site verified and marked as necessary  * Patient was positioned for comfort  * Consent was signed and verified   In the presence of: Witness: AMAURY Hoover  Injection #: 1  Needle:  22 gauge  Procedure: This procedure was discussed with Concepcion Locke and other therapeutic options were considered (risks vs benefits). Concepcion Locke and I thought that an injection was merited. After informed consent was obtained, landmarks were identified(marked), and the left joint  was cleansed with ChlorPrep in the standard sterile manner. 3mL  1% lidocaine  and  1mL Kenalog  was then injected and needle tenotomy was not performed. Procedure performed with ultrasound needle guidance. The needle was then withdrawn. T he procedure was well tolerated. The patient is asked to continue to rest the area for a few more days before resuming regular activities. It may be more painful for the first 1-2 days. NSAIDS are to be avoided. Watch for fever, or increased swelling or persistent pain in the joint. Call or return to clinic prn if such symptoms occur or there is failure to improve as anticipated. The procedure did provide relief of symptoms in the clinic. RTC in 4 weeks for reevaluation and possible reinjection. Given the patient's body habitus and the anatomically deep nature of this structure, sonographic guidance is recommended to prevent injury to neurovascular structures and confirm accuracy of injection. Furthermore, this patient has failed conservative treatment with physical therapy and modalities and the diagnostic and therapeutic accuracy is important. Assessment/ Plan:   Diagnoses and all orders for this visit:    1.  Patellofemoral arthritis  -     TRIAMCINOLONE ACETONIDE INJ  -     triamcinolone acetonide (KENALOG) 40 mg/mL injection; 1 mL by IntraMUSCular route once for 1 dose. -     20606 - DRAIN/INJECT INTERMEDIATE JOINT/BURSA WITH US    Will consider getting authorization for Euflexxa or Orthovisc if this steroid injections do not work  Follow-up Disposition:  Return if symptoms worsen or fail to improve. Pathophysiology, recovery and rehabilitation process discussed and questions answered   Counseling for 30 Minutes of the total visit duration   Pictures and figures used as necessary   Provided reassurance   Monitor response to injection   Discussed steroid side effects of fat atrophy, hypopigmentation, steroid flare or infection   Monitor response to Physical Therapy   Recommend activity modification   Recommend  lower impact activities-walking, Eliptical, Nordic Track, cycling or swimming   Follow up in 4 week(s)      I have discussed the diagnosis with the patient and the intended plan as seen in the above orders. The patient has received an after-visit summary and questions were answered concerning future plans. Medication Side Effects and Warnings were discussed with patient: yes  Patient Labs were reviewed and or requested: yes  Patient Past Records were reviewed and or requested  yes  I have discussed the diagnosis with the patient and the intended plan as seen in the above orders. The patient has received an after-visit summary and questions were answered concerning future plans. Pt agrees to call or return to clinic and/or go to closest ER with any worsening of symptoms. This may include, but not limited to increased fever (>100.4) with NSAIDS or Tylenol, increased edema, confusion, rash, worsening of presenting symptoms. Patient was informed/counseled to:    1) Remember to stay active and/or exercise regularly (I suggest 30-45 minutes daily)   2) For reliable dietary information, go to www. EATRIGHT.org. You may wish to consider seeing the nutritionist at [facility] at #[phone] or [phone], also consider the Mediterranean diet.   3) I routinely suggest a complete physical exam once each year (your birth month)

## 2017-07-27 NOTE — PROGRESS NOTES
Pain reassessed after injection, Pain level 0/10. Patient advised to contact the office if pain becomes severe, redness, warm to the touch or swelling at the injection site and signs of infection, nausea vomiting fever or chills. Patient verbalized understanding.

## 2017-09-14 NOTE — ANCILLARY DISCHARGE INSTRUCTIONS
[address] [place] [address] Physical Therapy  [address] [address], [address] [place] [address]  Phone: [phone]  Fax: [phone]    Discharge Summary  2-15    Patient name: Hannah Shoemaker  : 1960  Provider#: [id number]  Referral source: Shelley Goss MD      Medical/Treatment Diagnosis: Pain in right knee [M25.561]     Prior Hospitalization: see medical history     Comorbidities: HTN, OA  Prior Level of Function:walking daily 5-6 blocks  Medications: Verified on Patient Summary List    Start of Care: 17      Onset Date:5 years  Visits from Start of Care: 7     Missed Visits: 6  Reporting Period : 17 to 17    Progress towards goals / Updated goals:  Short Term Goals: To be accomplished in 2 weeks:  1)Pt will be Independent with HEP.  MET  2) Pt will be able to complete 10 SLR into flexion.       Long Term Goals: To be accomplished in 6-8 weeks:  1)Pt will be able to Navigate one flight of stairs without pain.  Progressing  2) Pt will be able to Stand for >/= 20 minutes without pain.  Progressing  3) Pt will be able to Ambulate >/= 10 blocks  Progressing  4) Pt will be able to perform 20 SLR into flexion PTOGRESSING    ASSESSMENT/SUMMARY OF CARE: The pt was seen for 7 sessions at [place] [address] and made great progress with decreased pain, improvement in ROM, strength and balance. Pt reported no pain her last 3 sessions over a 2 week period and was independent with her HEP.      RECOMMENDATIONS:  [x]Discontinue therapy: [x]Patient has reached or is progressing toward set goals      []Patient is non-compliant or has abdicated      []Due to lack of appreciable progress towards set goals    Leslie Gerard 2017 11:47 AM

## 2018-04-19 ENCOUNTER — OFFICE VISIT (OUTPATIENT)
Dept: FAMILY MEDICINE CLINIC | Age: 58
End: 2018-04-19

## 2018-04-19 VITALS
WEIGHT: 150 LBS | HEART RATE: 66 BPM | SYSTOLIC BLOOD PRESSURE: 119 MMHG | DIASTOLIC BLOOD PRESSURE: 75 MMHG | RESPIRATION RATE: 18 BRPM | BODY MASS INDEX: 22.22 KG/M2 | OXYGEN SATURATION: 100 % | TEMPERATURE: 98.4 F | HEIGHT: 69 IN

## 2018-04-19 DIAGNOSIS — E55.9 VITAMIN D DEFICIENCY: ICD-10-CM

## 2018-04-19 DIAGNOSIS — E87.6 HYPOKALEMIA: ICD-10-CM

## 2018-04-19 DIAGNOSIS — I10 BENIGN ESSENTIAL HTN: Primary | ICD-10-CM

## 2018-04-19 NOTE — MR AVS SNAPSHOT
[facility] 
 
 
 [address] [facility] [address] 
[phone] Patient: Alvaro Carlson MRN: [id number] MNO:[id number] Visit Information Date & Time Provider Department Dept. Phone Encounter #  
 4/19/2018  8:30 AM Karlos Gallego  [facility] [phone] [id number] Upcoming Health Maintenance Date Due DTaP/Tdap/Td series (1 - Tdap) 3/15/2016 PAP AKA CERVICAL CYTOLOGY 7/1/2017 BREAST CANCER SCRN MAMMOGRAM 11/11/2017 Influenza Age 5 to Adult 9/1/2018* COLONOSCOPY 4/18/2024 *Topic was postponed. The date shown is not the original due date. Allergies as of 4/19/2018  Review Complete On: 4/19/2018 By: Karlos Gallego NP Severity Noted Reaction Type Reactions Pcn [Penicillins]  11/22/2011    Hives  
 itching Current Immunizations  Reviewed on 4/6/2015 Name Date Td, Adsorbed PF 3/14/2016 Not reviewed this visit You Were Diagnosed With   
  
 Codes Comments Benign essential HTN    -  Primary ICD-10-CM: I10 
ICD-9-CM: 401.1 Vitamin D deficiency     ICD-10-CM: E55.9 ICD-9-CM: 268.9 Vitals BP Pulse Temp Resp Height(growth percentile) Weight(growth percentile) 119/75 (BP 1 Location: Left arm, BP Patient Position: Sitting) 66 98.4 °F (36.9 °C) (Oral) 18 5' 9\" (1.753 m) 150 lb (68 kg) SpO2 BMI OB Status Smoking Status 100% 22.15 kg/m2 Hysterectomy Former Smoker BMI and BSA Data Body Mass Index Body Surface Area  
 22.15 kg/m 2 1.82 m 2 Preferred Pharmacy Pharmacy Name Phone [address] [facility], [address] [facility] [phone] Your Updated Medication List  
  
   
This list is accurate as of 4/19/18  9:13 AM.  Always use your most recent med list.  
  
  
  
  
 MULTIVITAMIN PO Take  by mouth daily. potassium chloride 10 mEq tablet Commonly known as:  KLOR-CON Take 1 Tab by mouth daily. We Performed the Following COLLECTION VENOUS BLOOD,VENIPUNCTURE X3050007 CPT(R)] METABOLIC PANEL, BASIC [31040 CPT(R)] KS HANDLG&/OR CONVEY OF SPEC FOR TR OFFICE TO LAB [14267 CPT(R)] VITAMIN D, 25 HYDROXY [id number] CPT(R)] Introducing [facility]! Seble Bruno introduces [employer] patient portal. Now you can access parts of your medical record, email your doctor's office, and request medication refills online. 1. In your internet browser, go to [email or web address] 2. Click on the First Time User? Click Here link in the Sign In box. You will see the New Member Sign Up page. 3. Enter your [employer] Access Code exactly as it appears below. You will not need to use this code after youve completed the sign-up process. If you do not sign up before the expiration date, you must request a new code. · [employer] Access Code: [email or web address] Expires: 7/18/2018  9:13 AM 
 
4. Enter the last four digits of your Social Security Number (xxxx) and Date of Birth (mm/dd/yyyy) as indicated and click Submit. You will be taken to the next sign-up page. 5. Create a [employer] ID. This will be your [employer] login ID and cannot be changed, so think of one that is secure and easy to remember. 6. Create a [employer] password. You can change your password at any time. 7. Enter your Password Reset Question and Answer. This can be used at a later time if you forget your password. 8. Enter your e-mail address. You will receive e-mail notification when new information is available in [address] 9. Click Sign Up. You can now view and download portions of your medical record. 10. Click the Download Summary menu link to download a portable copy of your medical information. If you have questions, please visit the Frequently Asked Questions section of the [employer] website.  Remember, [employer] is NOT to be used for urgent needs. For medical emergencies, dial 911. Now available from your iPhone and Android! Please provide this summary of care documentation to your next provider. Your primary care clinician is listed as José Miguel Monroe. If you have any questions after today's visit, please call [phone].

## 2018-04-19 NOTE — PROGRESS NOTES
Chief Complaint   Patient presents with    Blood Pressure Check    Hand Pain     R hand px x3 week. 1. Have you been to the ER, urgent care clinic since your last visit? Hospitalized since your last visit? No    2. Have you seen or consulted any other health care providers outside of the [address] [place] [address] since your last visit? Include any pap smears or colon screening.  No

## 2018-04-19 NOTE — PROGRESS NOTES
HISTORY OF PRESENT ILLNESS  Jai Osman is a 59 y.o. female. HPI  Follow up HTN. Patient was previously on BP lowering medication chlorthalidone. Stopped med about 1 year ago when she became normotensive. History of hypokalemia on diuretic. Was taking potassium supplement that was also discontinued. Last documented K+ 3.6. Following healthy diet and walking regularly. History of vitamin D deficiency. Taking vitamin D supplement daily. Past Medical History:   Diagnosis Date    Adult acne     Arthritis     History of colonic polyps     2014 study normal, f/u 5 years Autumn Powers)    Hypertension     HISTORY NOT TAKING ANY MEDS CURRENTLY    Lipoma     right upper extremity    Post-menopausal     Right inguinal hernia 3/16/2016    Wears dentures      Patient Active Problem List   Diagnosis Code    Seasonal allergic rhinitis J30.2    Benign essential HTN I10     Current Outpatient Prescriptions   Medication Sig    MULTIVITAMIN PO Take  by mouth daily.  potassium chloride (K-DUR, KLOR-CON) 10 mEq tablet Take 1 Tab by mouth daily. No current facility-administered medications for this visit. Social History   Substance Use Topics    Smoking status: Former Smoker     Packs/day: 0.00     Years: 4.00     Types: Cigarettes     Quit date: 1/1/1991    Smokeless tobacco: Never Used      Comment: SMOKED 1 CIGARRETTE DAILY FOR 4 YEARS    Alcohol use No     Visit Vitals    /75 (BP 1 Location: Left arm, BP Patient Position: Sitting)    Pulse 66    Temp 98.4 °F (36.9 °C) (Oral)    Resp 18    Ht 5' 9\" (1.753 m)    Wt 150 lb (68 kg)    SpO2 100%    BMI 22.15 kg/m2     Review of Systems   Constitutional: Negative. Respiratory: Negative for cough and shortness of breath. Cardiovascular: Negative for chest pain, palpitations and leg swelling. Neurological: Negative for dizziness and headaches. All other systems reviewed and are negative.       Physical Exam   Constitutional: She appears well-developed and well-nourished. No distress. Neck: Neck supple. Cardiovascular: Normal rate, regular rhythm and normal heart sounds. Pulmonary/Chest: Effort normal and breath sounds normal.   Abdominal: Soft. She exhibits no distension. There is no tenderness. There is no guarding. Musculoskeletal: She exhibits no edema. Lymphadenopathy:     She has no cervical adenopathy. Neurological: She is alert. Skin: Skin is warm and dry. Psychiatric: She has a normal mood and affect. ASSESSMENT and PLAN  Diagnoses and all orders for this visit:    1. Benign essential HTN  -     METABOLIC PANEL, BASIC  -     AR HANDLG&/OR CONVEY OF SPEC FOR TR OFFICE TO LAB  -     COLLECTION VENOUS BLOOD,VENIPUNCTURE  Well controlled off med. Reviewed healthy diet and exercise recommendations. Check non fasting BMP. 2. Vitamin D deficiency  -     VITAMIN D, 25 HYDROXY    3. Hypokalemia  Recheck today. Follow up 6-12 months or sooner prn.

## 2018-04-20 LAB
25(OH)D3+25(OH)D2 SERPL-MCNC: 37.8 NG/ML (ref 30–100)
BUN SERPL-MCNC: 13 MG/DL (ref 6–24)
BUN/CREAT SERPL: 14 (ref 9–23)
CALCIUM SERPL-MCNC: 9 MG/DL (ref 8.7–10.2)
CHLORIDE SERPL-SCNC: 103 MMOL/L (ref 96–106)
CO2 SERPL-SCNC: 26 MMOL/L (ref 18–29)
CREAT SERPL-MCNC: 0.92 MG/DL (ref 0.57–1)
GFR SERPLBLD CREATININE-BSD FMLA CKD-EPI: 68 ML/MIN/1.73
GFR SERPLBLD CREATININE-BSD FMLA CKD-EPI: 79 ML/MIN/1.73
GLUCOSE SERPL-MCNC: 87 MG/DL (ref 65–99)
POTASSIUM SERPL-SCNC: 3.8 MMOL/L (ref 3.5–5.2)
SODIUM SERPL-SCNC: 145 MMOL/L (ref 134–144)

## 2018-12-21 ENCOUNTER — OFFICE VISIT (OUTPATIENT)
Dept: INTERNAL MEDICINE CLINIC | Age: 58
End: 2018-12-21

## 2018-12-21 VITALS
HEIGHT: 69 IN | BODY MASS INDEX: 22.45 KG/M2 | TEMPERATURE: 98.3 F | DIASTOLIC BLOOD PRESSURE: 77 MMHG | OXYGEN SATURATION: 100 % | SYSTOLIC BLOOD PRESSURE: 128 MMHG | WEIGHT: 151.6 LBS | HEART RATE: 65 BPM | RESPIRATION RATE: 16 BRPM

## 2018-12-21 DIAGNOSIS — M17.10 PATELLOFEMORAL ARTHRITIS: Primary | ICD-10-CM

## 2018-12-21 RX ORDER — TRIAMCINOLONE ACETONIDE 40 MG/ML
40 INJECTION, SUSPENSION INTRA-ARTICULAR; INTRAMUSCULAR ONCE
Qty: 1 ML | Refills: 0
Start: 2018-12-21 | End: 2018-12-21

## 2018-12-21 NOTE — PROGRESS NOTES
Chief Complaint   Patient presents with    Knee Pain     she is a 59y.o. year old female who presents for follow up of injury. Follow Up Pain Assessment Encounter      Onset of Symptoms: a couple years  ________________________________________________________________________  Description: Pain is now back and wanting injection      Pain Scale:(1-10): 7  Duration:  continuous  Radiation: none  What makes it better?: OTC meds and rest  What makes it worse?:exercise and walking  Medications tried: acetaminophen, ibuprofen  Modalities tried: injection        Reviewed and agree with Nurse Note and duplicated in this note. Reviewed PmHx, RxHx, FmHx, SocHx, AllgHx and updated and dated in the chart.     Family History   Problem Relation Age of Onset    Alcohol abuse Father     Liver Disease Father         cirrhosis    Stroke Maternal Grandmother     Prostate Cancer Maternal Grandfather     Heart Disease Mother         Has Debrillator    Hypertension Mother     Pacemaker Mother     Anesth Problems Neg Hx        Past Medical History:   Diagnosis Date    Adult acne     Arthritis     History of colonic polyps     2014 study normal, f/u 5 years Fayetta Majestic)    Hypertension     HISTORY NOT TAKING ANY MEDS CURRENTLY    Lipoma     right upper extremity    Post-menopausal     Right inguinal hernia 3/16/2016    Wears dentures       Social History     Socioeconomic History    Marital status: SINGLE     Spouse name: Not on file    Number of children: Not on file    Years of education: Not on file    Highest education level: Not on file   Occupational History    Occupation: [employer]    Tobacco Use    Smoking status: Former Smoker     Packs/day: 0.00     Years: 4.00     Pack years: 0.00     Types: Cigarettes     Last attempt to quit: 1991     Years since quittin.9    Smokeless tobacco: Never Used    Tobacco comment: SMOKED 1 CIGARRETTE DAILY FOR 4 YEARS   Substance and Sexual Activity    Alcohol use: No     Alcohol/week: 0.0 oz    Drug use: No    Sexual activity: No   Other Topics Concern     Service No    Blood Transfusions No    Caffeine Concern Yes     Comment: 0-1 cup daily (previously 1-2 per day)    Occupational Exposure No    Sleep Concern Yes     Comment: restless sleep    Stress Concern No    Weight Concern No    Special Diet No    Exercise Yes     Comment: walks routinely   Social History Narrative    Single, has two children (son and daughter). [facility]. Review of Systems - negative except as listed above      Objective:     Vitals:    12/21/18 0925   Weight: 151 lb 9.6 oz (68.8 kg)   Height: 5' 9\" (1.753 m)       Physical Examination: General appearance - alert, well appearing, and in no distress  Back exam - full range of motion, no tenderness, palpable spasm or pain on motion  Neurological - alert, oriented, normal speech, no focal findings or movement disorder noted  Musculoskeletal - left knee exam:  The patient'sleft Knee  is  normal to inspection. The ROM is normal and there is flexion to Normal Effusion is: mild The joint exhibits Negative warmth and Crepitus is: moderate. The Stanley test is:  negative Joint Line Tenderness is  positive medial.  The Thessaly Test is not tested  and the Lachman is  negative. The Anterior Drawer is negative. The Posterior Drawer is:  negative.  Valgus Stress (for MCL) is:  normal . Varus Stress (for LCL) is  normal  . The Jacqueline Test is negative and the Apprehension Sign:  negative  Patellar Grind negative and Tracking   Extremities - peripheral pulses normal, no pedal edema, no clubbing or cyanosis  Skin - normal coloration and turgor, no rashes, no suspicious skin lesions noted  Time Out taken at:  9:50 AM  12/21/2018    * Patient was identified by name and date of birth   * Agreement on procedure being performed was verified  * Risks and Benefits explained to the patient  * Procedure site verified and marked as necessary   In the presence of: Witness: Hortensia, AMAURY  Injection #: 1  Needle:  22 gauge  Procedure: This procedure was discussed with Horacio Valenzuela and other therapeutic options were considered (risks vs benefits). Horacio Valenzuela and I thought that an injection was merited. After informed consent was obtained, landmarks were identified(marked), and the left joint  was cleansed with ChlorPrep in the standard sterile manner. 3mL  1% lidocaine  and  1mL Kenalog  was then injected and needle tenotomy was not performed. Procedure performed with ultrasound needle guidance. The needle was then withdrawn. T he procedure was well tolerated. The patient is asked to continue to rest the area for a few more days before resuming regular activities. It may be more painful for the first 1-2 days. NSAIDS are to be avoided. Watch for fever, or increased swelling or persistent pain in the joint. Call or return to clinic prn if such symptoms occur or there is failure to improve as anticipated. The procedure did provide relief of symptoms in the clinic. RTC in 4 weeks for reevaluation and possible reinjection. Given the patient's body habitus and the anatomically deep nature of this structure, sonographic guidance is recommended to prevent injury to neurovascular structures and confirm accuracy of injection. Furthermore, this patient has failed conservative treatment with physical therapy and modalities and the diagnostic and therapeutic accuracy is important. Assessment/ Plan:   Diagnoses and all orders for this visit:    1. Patellofemoral arthritis  -     HI ARTHROCENTESIS ASPIR&/INJ INTERM JT/BURS W/US  -     TRIAMCINOLONE ACETONIDE INJ  -     triamcinolone acetonide (KENALOG) 40 mg/mL injection; 1 mL by Intra artICUlar route once for 1 dose.        Follow-up Disposition: Not on File    Pathophysiology, recovery and rehabilitation process discussed and questions answered Counseling for 30 Minutes of the total visit duration   Pictures and figures used as necessary   Provided reassurance   Monitor response to injection   Discussed steroid side effects of fat atrophy, hypopigmentation, steroid flare or infection   Monitor response to Physical Therapy   Recommend activity modification   Recommend  lower impact activities-walking, Eliptical, Nordic Track, cycling or swimming   Follow up in 4 week(s)      I have discussed the diagnosis with the patient and the intended plan as seen in the above orders. The patient has received an after-visit summary and questions were answered concerning future plans. Medication Side Effects and Warnings were discussed with patient,  Patient Labs were reviewed and or requested, and  Patient Past Records were reviewed and or requested  yes     Pt agrees to call or return to clinic and/or go to closest ER with any worsening of symptoms. This may include, but not limited to increased fever (>100.4) with NSAIDS or Tylenol, increased edema, confusion, rash, worsening of presenting symptoms.

## 2018-12-21 NOTE — PATIENT INSTRUCTIONS
If you have had an injection today, continue to rest the area for a few more days before resuming regular activities. It may be more painful for the first 1-2 days. NSAIDS are to be avoided. Watch for fever, or increased swelling or persistent pain in the joint. Call or return to clinic prn if such symptoms occur or there is failure to improve as anticipated. 1) Remember to stay active and/or exercise regularly (I suggest 30-45 minutes daily)   2) For reliable dietary information, go to www. EATRIGHT.org. You may wish to consider seeing the nutritionists:  Antonio Reeder @ [facility] or [address] - [phone] / [phone]  Or:  John Mistry @ [facility] or [address] [facility] [phone],    - also consider the Mediterranean diet and DASH diets  3) I routinely suggest a complete physical exam once each year (your birth month)

## 2019-10-29 ENCOUNTER — TELEPHONE (OUTPATIENT)
Dept: INTERNAL MEDICINE CLINIC | Age: 59
End: 2019-10-29

## 2019-10-29 NOTE — TELEPHONE ENCOUNTER
Patient called wanting to see [facility] for a problem that she is having. I tried to explain to the patient that since she has not been seen in 4 years that [facility] would not be able to see her. She wants [facility] to call her back at [phone] to tell her that she can not be seen since she is considered a new patient.

## 2019-10-30 NOTE — TELEPHONE ENCOUNTER
Left voicemail explaining patient should contact Dr. Yahir Cardoza office for any issue that she is having. Advised urgent care if there is no availability at Dr. Yahir Cardoza office.

## 2019-10-30 NOTE — TELEPHONE ENCOUNTER
Please let her know you spoke with me, and I work for [employer] [facility]. She is not a patient here, and I can not see her.

## 2019-12-20 ENCOUNTER — APPOINTMENT (OUTPATIENT)
Dept: GENERAL RADIOLOGY | Age: 59
End: 2019-12-20
Attending: PHYSICIAN ASSISTANT
Payer: COMMERCIAL

## 2019-12-20 ENCOUNTER — HOSPITAL ENCOUNTER (EMERGENCY)
Age: 59
Discharge: HOME | End: 2019-12-20
Attending: EMERGENCY MEDICINE | Admitting: EMERGENCY MEDICINE
Payer: COMMERCIAL

## 2019-12-20 VITALS
HEART RATE: 74 BPM | OXYGEN SATURATION: 99 % | BODY MASS INDEX: 22.96 KG/M2 | HEIGHT: 69 IN | TEMPERATURE: 98.3 F | DIASTOLIC BLOOD PRESSURE: 88 MMHG | RESPIRATION RATE: 16 BRPM | SYSTOLIC BLOOD PRESSURE: 143 MMHG | WEIGHT: 155 LBS
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
## 2019-12-20 PROCEDURE — 99283 EMERGENCY DEPT VISIT LOW MDM: CPT

## 2019-12-20 PROCEDURE — 74011250637 HC RX REV CODE- 250/637: Performed by: PHYSICIAN ASSISTANT

## 2019-12-20 PROCEDURE — 71101 X-RAY EXAM UNILAT RIBS/CHEST: CPT

## 2019-12-20 RX ORDER — METHOCARBAMOL 500 MG/1
500 TABLET, FILM COATED ORAL 4 TIMES DAILY
Qty: 10 TAB | Refills: 0 | Status: SHIPPED | OUTPATIENT
Start: 2019-12-20 | End: 2019-12-30

## 2019-12-20 RX ORDER — IBUPROFEN 600 MG/1
600 TABLET ORAL
Status: COMPLETED | OUTPATIENT
Start: 2019-12-20 | End: 2019-12-20

## 2019-12-20 RX ORDER — IBUPROFEN 600 MG/1
600 TABLET ORAL
Qty: 20 TAB | Refills: 0 | Status: SHIPPED | OUTPATIENT
Start: 2019-12-20 | End: 2021-08-05 | Stop reason: ALTCHOICE

## 2019-12-20 RX ADMIN — IBUPROFEN 600 MG: 600 TABLET, FILM COATED ORAL at 20:47
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
## 2019-12-21 NOTE — DISCHARGE INSTRUCTIONS

## 2019-12-30 ENCOUNTER — OFFICE VISIT (OUTPATIENT)
Dept: FAMILY MEDICINE CLINIC | Age: 59
End: 2019-12-30

## 2019-12-30 VITALS
HEIGHT: 69 IN | OXYGEN SATURATION: 98 % | TEMPERATURE: 98.6 F | HEART RATE: 68 BPM | DIASTOLIC BLOOD PRESSURE: 84 MMHG | WEIGHT: 162 LBS | BODY MASS INDEX: 23.99 KG/M2 | RESPIRATION RATE: 16 BRPM | SYSTOLIC BLOOD PRESSURE: 132 MMHG

## 2019-12-30 DIAGNOSIS — S01.311A LACERATION OF RIGHT EAR LOBE, INITIAL ENCOUNTER: ICD-10-CM
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
## 2019-12-30 DIAGNOSIS — R07.81 RIB PAIN ON RIGHT SIDE: Primary | ICD-10-CM

## 2019-12-30 DIAGNOSIS — M25.511 ACUTE PAIN OF RIGHT SHOULDER: ICD-10-CM

## 2019-12-30 RX ORDER — SODIUM, POTASSIUM,MAG SULFATES 17.5-3.13G
SOLUTION, RECONSTITUTED, ORAL ORAL
COMMUNITY
Start: 2019-10-31 | End: 2019-12-30 | Stop reason: ALTCHOICE
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
## 2020-01-09 ENCOUNTER — OFFICE VISIT (OUTPATIENT)
Dept: INTERNAL MEDICINE CLINIC | Age: 60
End: 2020-01-09

## 2020-01-09 VITALS
HEART RATE: 80 BPM | DIASTOLIC BLOOD PRESSURE: 68 MMHG | OXYGEN SATURATION: 100 % | RESPIRATION RATE: 16 BRPM | HEIGHT: 69 IN | BODY MASS INDEX: 24.14 KG/M2 | SYSTOLIC BLOOD PRESSURE: 136 MMHG | WEIGHT: 163 LBS

## 2020-01-09 DIAGNOSIS — M79.601 RIGHT ARM PAIN: ICD-10-CM

## 2020-01-09 DIAGNOSIS — M25.511 ACUTE PAIN OF RIGHT SHOULDER: Primary | ICD-10-CM

## 2020-01-09 DIAGNOSIS — S13.4XXA WHIPLASH INJURY TO NECK, INITIAL ENCOUNTER: ICD-10-CM

## 2020-01-09 RX ORDER — NAPROXEN 500 MG/1
500 TABLET ORAL 2 TIMES DAILY WITH MEALS
Qty: 30 TAB | Refills: 1 | OUTPATIENT
Start: 2020-01-09 | End: 2020-11-22
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
## 2020-01-29 ENCOUNTER — OFFICE VISIT (OUTPATIENT)
Dept: INTERNAL MEDICINE CLINIC | Age: 60
End: 2020-01-29

## 2020-01-29 VITALS
BODY MASS INDEX: 25.03 KG/M2 | DIASTOLIC BLOOD PRESSURE: 78 MMHG | RESPIRATION RATE: 16 BRPM | HEART RATE: 70 BPM | TEMPERATURE: 98 F | OXYGEN SATURATION: 99 % | WEIGHT: 169 LBS | SYSTOLIC BLOOD PRESSURE: 129 MMHG | HEIGHT: 69 IN

## 2020-01-29 DIAGNOSIS — N39.0 URINARY TRACT INFECTION WITH HEMATURIA, SITE UNSPECIFIED: ICD-10-CM

## 2020-01-29 DIAGNOSIS — R31.9 URINARY TRACT INFECTION WITH HEMATURIA, SITE UNSPECIFIED: ICD-10-CM

## 2020-01-29 DIAGNOSIS — M25.541 ARTHRALGIA OF BOTH HANDS: ICD-10-CM

## 2020-01-29 DIAGNOSIS — M25.542 ARTHRALGIA OF BOTH HANDS: ICD-10-CM

## 2020-01-29 DIAGNOSIS — R30.0 BURNING WITH URINATION: Primary | ICD-10-CM

## 2020-01-29 DIAGNOSIS — M25.511 ACUTE PAIN OF RIGHT SHOULDER: ICD-10-CM

## 2020-01-29 LAB
BILIRUB UR QL STRIP: NEGATIVE
GLUCOSE UR-MCNC: NEGATIVE MG/DL
KETONES P FAST UR STRIP-MCNC: NEGATIVE MG/DL
PH UR STRIP: 7 [PH] (ref 4.6–8)
PROT UR QL STRIP: NORMAL
SP GR UR STRIP: 1.02 (ref 1–1.03)
UA UROBILINOGEN AMB POC: NORMAL (ref 0.2–1)
URINALYSIS CLARITY POC: NORMAL
URINALYSIS COLOR POC: YELLOW
URINE BLOOD POC: NORMAL
URINE LEUKOCYTES POC: NORMAL
URINE NITRITES POC: POSITIVE

## 2020-01-29 RX ORDER — SULFAMETHOXAZOLE AND TRIMETHOPRIM 800; 160 MG/1; MG/1
1 TABLET ORAL 2 TIMES DAILY
Qty: 10 TAB | Refills: 0 | Status: SHIPPED | OUTPATIENT
Start: 2020-01-29 | End: 2020-02-03

## 2020-01-29 NOTE — PROGRESS NOTES
Chief Complaint   Patient presents with    Shoulder Pain     she is a 62y.o. year old female who presents for follow up of injury. Follow Up Pain Assessment Encounter      Onset of Symptoms: a couple weeks  ________________________________________________________________________  Description: Pain is now better and not really having any pain       Pain Scale:(1-10): 0 no pain   Duration:  No pain   Radiation: none  What makes it better?: OTC meds and rest  What makes it worse?:no pain   Medications tried: acetaminophen, ibuprofen  Modalities tried: meds    Other concerns: She also complains of blood in urine for the last few days. Patient believes she may have a UTI although she does not get UTIs. Denies any back pain or flank pain. Burning with urination. Reviewed and agree with Nurse Note and duplicated in this note. Reviewed PmHx, RxHx, FmHx, SocHx, AllgHx and updated and dated in the chart.     Family History   Problem Relation Age of Onset    Alcohol abuse Father     Liver Disease Father         cirrhosis    Stroke Maternal Grandmother     Prostate Cancer Maternal Grandfather     Heart Disease Mother         Has Debrillator    Hypertension Mother     Pacemaker Mother     Anesth Problems Neg Hx        Past Medical History:   Diagnosis Date    Adult acne     Arthritis     History of colonic polyps     2014 study normal, f/u 5 years [facility])    Hypertension     HISTORY NOT TAKING ANY MEDS CURRENTLY    Lipoma     right upper extremity    Post-menopausal     Right inguinal hernia 3/16/2016    Wears dentures       Social History     Socioeconomic History    Marital status: SINGLE     Spouse name: Not on file    Number of children: Not on file    Years of education: Not on file    Highest education level: Not on file   Occupational History    Occupation: [employer] [address] Use    Smoking status: Former Smoker     Packs/day: 0.00     Years: 4.00     Pack years: 0.00 Types: Cigarettes     Last attempt to quit: 1991     Years since quittin.0    Smokeless tobacco: Never Used    Tobacco comment: SMOKED 1 CIGARRETTE DAILY FOR 4 YEARS   Substance and Sexual Activity    Alcohol use: No     Alcohol/week: 0.0 standard drinks    Drug use: No    Sexual activity: Never   Other Topics Concern     Service No    Blood Transfusions No    Caffeine Concern Yes     Comment: 0-1 cup daily (previously 1-2 per day)    Occupational Exposure No    Sleep Concern Yes     Comment: restless sleep    Stress Concern No    Weight Concern No    Special Diet No    Exercise Yes     Comment: walks routinely   Social History Narrative    Single, has two children (son and daughter). [facility]. Review of Systems - negative except as listed above      Objective:     Vitals:    20 1645   BP: 129/78   Pulse: 70   Resp: 16   Temp: 98 °F (36.7 °C)   TempSrc: Oral   SpO2: 99%   Weight: 169 lb (76.7 kg)   Height: 5' 9\" (1.753 m)       Physical Examination: General appearance - alert, well appearing, and in no distress  Chest - clear to auscultation, no wheezes, rales or rhonchi, symmetric air entry  Heart - normal rate, regular rhythm, normal S1, S2, no murmurs, rubs, clicks or gallops  Abdomen - soft, nontender, nondistended, no masses or organomegaly  Back exam - full range of motion, no tenderness, palpable spasm or pain on motion  Neurological - alert, oriented, normal speech, no focal findings or movement disorder noted  Musculoskeletal - The right shoulder is  normal to inspection. The patient has full range of motion  . The shoulderis not tender to palpation . Bicep tendon: non-tender  The NEER test is negative. The Yi test:is negative   The Cross over test:is  negative. The Empty Can test:is  negative. Stressed ext rotation is:  negative. Stressed int rotation: negative. The Apprehension Sign is negative.     The Lift off test is: negative   Examination reveals the Painful Arch:  negative. The Labral Test is:  negative. The Sulcus Sign is:  negative. Extremities - peripheral pulses normal, no pedal edema, no clubbing or cyanosis  Skin - normal coloration and turgor, no rashes, no suspicious skin lesions noted    Assessment/ Plan:   Diagnoses and all orders for this visit:    1. Burning with urination  -     AMB POC URINALYSIS DIP STICK AUTO W/O MICRO    2. Arthralgia of both hands  -     RHEUMASSURE    3. Acute pain of right shoulder    4. Urinary tract infection with hematuria, site unspecified    Other orders  -     trimethoprim-sulfamethoxazole (BACTRIM DS, SEPTRA DS) 160-800 mg per tablet; Take 1 Tab by mouth two (2) times a day for 5 days. Pathophysiology, recovery and rehabilitation process discussed and questions answered   Counseling for 30 Minutes of the total visit duration   Pictures and figures used as necessary   Provided reassurance   Monitor response to home exercises   Recommend activity modification   Recommend  lower impact activities-walking, Eliptical, Nordic Track, cycling or swimming   Follow up in 4 week(s)      I have discussed the diagnosis with the patient and the intended plan as seen in the above orders. The patient has received an after-visit summary and questions were answered concerning future plans. Medication Side Effects and Warnings were discussed with patient,  Patient Labs were reviewed and or requested, and  Patient Past Records were reviewed and or requested  yes     Pt agrees to call or return to clinic and/or go to closest ER with any worsening of symptoms. This may include, but not limited to increased fever (>100.4) with NSAIDS or Tylenol, increased edema, confusion, rash, worsening of presenting symptoms.

## 2020-08-13 ENCOUNTER — OFFICE VISIT (OUTPATIENT)
Dept: INTERNAL MEDICINE CLINIC | Age: 60
End: 2020-08-13
Payer: COMMERCIAL

## 2020-08-13 VITALS
RESPIRATION RATE: 14 BRPM | WEIGHT: 163.5 LBS | BODY MASS INDEX: 24.22 KG/M2 | HEART RATE: 67 BPM | SYSTOLIC BLOOD PRESSURE: 136 MMHG | TEMPERATURE: 98.6 F | OXYGEN SATURATION: 98 % | HEIGHT: 69 IN | DIASTOLIC BLOOD PRESSURE: 80 MMHG

## 2020-08-13 DIAGNOSIS — Z01.419 WELL WOMAN EXAM: Primary | ICD-10-CM

## 2020-08-13 PROCEDURE — 99396 PREV VISIT EST AGE 40-64: CPT | Performed by: FAMILY MEDICINE

## 2020-08-13 NOTE — PROGRESS NOTES
No chief complaint on file. she is a 56y.o. year old female who presents for CPE  Complete Physical Exam Questions:    LMP -  menopause  Last Pap (q 3 years, or q5 with HPV) - 2018  Last Mammogram (50-74 biennially)- 2020  Hx of abnl Pap - Yes    1. Do you follow a low fat diet? yes  2. Are you up to date on your Tdap (<10 years)? Yes  3. Have you ever had a Pneumovax vaccine (>65)? Unknown   PCV13 Unknown   PPSV23 Unknown  4. Have you had Zoster vaccine (>60)? Unknown  5. Have you had the HPV - Gardasil (13- 26)? No  6. Do you follow an exercise program?  no  7. Do you smoke?  no  8. Do you consider yourself overweight?  no  9. Is there a family history of CAD< age 48? yes  8. Is there a family history of Cancer?  no  11. Do you know your Cancer risks? No  12. Have you had a colonoscopy? yes  13. Have you been tested for HIV or other STI's? No HIV testing today(18-66 y/o)? No  14. Have had a bone density scan or DEXA done(>65)? No  15. Have you had an EKG performed in the last five years (>50)? No    Other complaints:    Reviewed and agree with Nurse Note and duplicated in this note. Reviewed PmHx, RxHx, FmHx, SocHx, AllgHx and updated and dated in the chart.     Family History   Problem Relation Age of Onset    Alcohol abuse Father     Liver Disease Father         cirrhosis    Stroke Maternal Grandmother     Prostate Cancer Maternal Grandfather     Heart Disease Mother         Has Debrillator    Hypertension Mother     Pacemaker Mother     Anesth Problems Neg Hx        Past Medical History:   Diagnosis Date    Adult acne     Arthritis     History of colonic polyps     2014 study normal, f/u 5 years Maritza Valenzuela)    Hypertension     HISTORY NOT TAKING ANY MEDS CURRENTLY    Lipoma     right upper extremity    Post-menopausal     Right inguinal hernia 3/16/2016    Wears dentures       Social History     Socioeconomic History    Marital status: SINGLE     Spouse name: Not on file    Number of children: Not on file    Years of education: Not on file    Highest education level: Not on file   Occupational History    Occupation: [address] [place], [place] [address]   Tobacco Use    Smoking status: Former Smoker     Packs/day: 0.00     Years: 4.00     Pack years: 0.00     Types: Cigarettes     Last attempt to quit: 1991     Years since quittin.6    Smokeless tobacco: Never Used    Tobacco comment: SMOKED 1 CIGARRETTE DAILY FOR 4 YEARS   Substance and Sexual Activity    Alcohol use: No     Alcohol/week: 0.0 standard drinks    Drug use: No    Sexual activity: Never   Other Topics Concern     Service No    Blood Transfusions No    Caffeine Concern Yes     Comment: 0-1 cup daily (previously 1-2 per day)    Occupational Exposure No    Sleep Concern Yes     Comment: restless sleep    Stress Concern No    Weight Concern No    Special Diet No    Exercise Yes     Comment: walks routinely   Social History Narrative    Single, has two children (son and daughter). [address] [place] [address].           Review of Systems - negative except as listed above      Objective:     Vitals:    20 1353   BP: 136/80   Pulse: 67   Resp: 14   Temp: 98.6 °F (37 °C)   TempSrc: Oral   SpO2: 98%   Weight: 163 lb 8 oz (74.2 kg)   Height: 5' 9\" (1.753 m)       Physical Examination: General appearance - alert, well appearing, and in no distress  Eyes - pupils equal and reactive, extraocular eye movements intact  Ears - bilateral TM's and external ear canals normal  Nose - normal and patent, no erythema, discharge or polyps  Mouth - mucous membranes moist, pharynx normal without lesions  Neck - supple, no significant adenopathy  Chest - clear to auscultation, no wheezes, rales or rhonchi, symmetric air entry  Heart - normal rate, regular rhythm, normal S1, S2, no murmurs, rubs, clicks or gallops  Abdomen - soft, nontender, nondistended, no masses or organomegaly  Back exam - full range of motion, no tenderness, palpable spasm or pain on motion  Neurological - alert, oriented, normal speech, no focal findings or movement disorder noted  Musculoskeletal - no joint tenderness, deformity or swelling  Extremities - peripheral pulses normal, no pedal edema, no clubbing or cyanosis  Skin - normal coloration and turgor, no rashes, no suspicious skin lesions noted      Assessment/ Plan:   Diagnoses and all orders for this visit:    1. Well woman exam  -     CBC W/O DIFF  -     LIPID PANEL  -     METABOLIC PANEL, COMPREHENSIVE           Labs to be drawn: CBC, CMP, Lipid            I have discussed the diagnosis with the patient and the intended plan as seen in the above orders. The patient has received an after-visit summary and questions were answered concerning future plans. Medication Side Effects and Warnings were discussed with patient,  Patient Labs were reviewed and or requested, and  Patient Past Records were reviewed and or requested  yes         Pt agrees to call or return to clinic and/or go to closest ER with any worsening of symptoms. This may include, but not limited to increased fever (>100.4) with NSAIDS or Tylenol, increased edema, confusion, rash, worsening of presenting symptoms. Please note that this dictation was completed with [employer], the [employer] voice recognition software. Quite often unanticipated grammatical, syntax, homophones, and other interpretive errors are inadvertently transcribed by the computer software. Please disregard these errors. Please excuse any errors that have escaped final proofreading. Thank you.

## 2020-08-14 LAB
ALBUMIN SERPL-MCNC: 4.3 G/DL (ref 3.8–4.8)
ALBUMIN/GLOB SERPL: 1.7 {RATIO} (ref 1.2–2.2)
ALP SERPL-CCNC: 78 IU/L (ref 39–117)
ALT SERPL-CCNC: 9 IU/L (ref 0–32)
AST SERPL-CCNC: 11 IU/L (ref 0–40)
BILIRUB SERPL-MCNC: 0.4 MG/DL (ref 0–1.2)
BUN SERPL-MCNC: 14 MG/DL (ref 8–27)
BUN/CREAT SERPL: 17 (ref 12–28)
CALCIUM SERPL-MCNC: 9 MG/DL (ref 8.7–10.3)
CHLORIDE SERPL-SCNC: 105 MMOL/L (ref 96–106)
CHOLEST SERPL-MCNC: 154 MG/DL (ref 100–199)
CO2 SERPL-SCNC: 28 MMOL/L (ref 20–29)
CREAT SERPL-MCNC: 0.81 MG/DL (ref 0.57–1)
ERYTHROCYTE [DISTWIDTH] IN BLOOD BY AUTOMATED COUNT: 13.1 % (ref 11.7–15.4)
GLOBULIN SER CALC-MCNC: 2.5 G/DL (ref 1.5–4.5)
GLUCOSE SERPL-MCNC: 87 MG/DL (ref 65–99)
HCT VFR BLD AUTO: 36 % (ref 34–46.6)
HDLC SERPL-MCNC: 82 MG/DL
HGB BLD-MCNC: 11.8 G/DL (ref 11.1–15.9)
LDLC SERPL CALC-MCNC: 63 MG/DL (ref 0–99)
MCH RBC QN AUTO: 30.7 PG (ref 26.6–33)
MCHC RBC AUTO-ENTMCNC: 32.8 G/DL (ref 31.5–35.7)
MCV RBC AUTO: 94 FL (ref 79–97)
PLATELET # BLD AUTO: 201 X10E3/UL (ref 150–450)
POTASSIUM SERPL-SCNC: 3.4 MMOL/L (ref 3.5–5.2)
PROT SERPL-MCNC: 6.8 G/DL (ref 6–8.5)
RBC # BLD AUTO: 3.84 X10E6/UL (ref 3.77–5.28)
SODIUM SERPL-SCNC: 146 MMOL/L (ref 134–144)
TRIGL SERPL-MCNC: 46 MG/DL (ref 0–149)
VLDLC SERPL CALC-MCNC: 9 MG/DL (ref 5–40)
WBC # BLD AUTO: 2.6 X10E3/UL (ref 3.4–10.8)

## 2020-11-22 ENCOUNTER — APPOINTMENT (OUTPATIENT)
Dept: GENERAL RADIOLOGY | Age: 60
End: 2020-11-22
Attending: EMERGENCY MEDICINE
Payer: COMMERCIAL

## 2020-11-22 ENCOUNTER — APPOINTMENT (OUTPATIENT)
Dept: CT IMAGING | Age: 60
End: 2020-11-22
Attending: EMERGENCY MEDICINE
Payer: COMMERCIAL

## 2020-11-22 ENCOUNTER — HOSPITAL ENCOUNTER (EMERGENCY)
Age: 60
Discharge: HOME | End: 2020-11-22
Attending: EMERGENCY MEDICINE
Payer: COMMERCIAL

## 2020-11-22 VITALS
HEIGHT: 69 IN | SYSTOLIC BLOOD PRESSURE: 130 MMHG | RESPIRATION RATE: 14 BRPM | DIASTOLIC BLOOD PRESSURE: 75 MMHG | TEMPERATURE: 97.3 F | WEIGHT: 166.23 LBS | HEART RATE: 62 BPM | BODY MASS INDEX: 24.62 KG/M2 | OXYGEN SATURATION: 98 %

## 2020-11-22 DIAGNOSIS — K56.609 SMALL BOWEL OBSTRUCTION (HCC): Primary | ICD-10-CM

## 2020-11-22 DIAGNOSIS — K40.30 INCARCERATED RIGHT INGUINAL HERNIA: ICD-10-CM

## 2020-11-22 LAB
ALBUMIN SERPL-MCNC: 4.2 G/DL (ref 3.5–5)
ALBUMIN/GLOB SERPL: 1.1 {RATIO} (ref 1.1–2.2)
ALP SERPL-CCNC: 116 U/L (ref 45–117)
ALT SERPL-CCNC: 15 U/L (ref 12–78)
ANION GAP SERPL CALC-SCNC: 6 MMOL/L (ref 5–15)
AST SERPL-CCNC: 13 U/L (ref 15–37)
BASOPHILS # BLD: 0 K/UL (ref 0–0.1)
BASOPHILS NFR BLD: 0 % (ref 0–1)
BILIRUB SERPL-MCNC: 0.7 MG/DL (ref 0.2–1)
BUN SERPL-MCNC: 15 MG/DL (ref 6–20)
BUN/CREAT SERPL: 15 (ref 12–20)
CALCIUM SERPL-MCNC: 9.3 MG/DL (ref 8.5–10.1)
CHLORIDE SERPL-SCNC: 107 MMOL/L (ref 97–108)
CO2 SERPL-SCNC: 27 MMOL/L (ref 21–32)
COMMENT, HOLDF: NORMAL
CREAT SERPL-MCNC: 1.01 MG/DL (ref 0.55–1.02)
DIFFERENTIAL METHOD BLD: ABNORMAL
EOSINOPHIL # BLD: 0 K/UL (ref 0–0.4)
EOSINOPHIL NFR BLD: 0 % (ref 0–7)
ERYTHROCYTE [DISTWIDTH] IN BLOOD BY AUTOMATED COUNT: 13.2 % (ref 11.5–14.5)
GLOBULIN SER CALC-MCNC: 4 G/DL (ref 2–4)
GLUCOSE SERPL-MCNC: 146 MG/DL (ref 65–100)
HCT VFR BLD AUTO: 39.7 % (ref 35–47)
HGB BLD-MCNC: 12.6 G/DL (ref 11.5–16)
IMM GRANULOCYTES # BLD AUTO: 0.1 K/UL (ref 0–0.04)
IMM GRANULOCYTES NFR BLD AUTO: 1 % (ref 0–0.5)
LACTATE SERPL-SCNC: 1.4 MMOL/L (ref 0.4–2)
LIPASE SERPL-CCNC: 86 U/L (ref 73–393)
LYMPHOCYTES # BLD: 0.4 K/UL (ref 0.8–3.5)
LYMPHOCYTES NFR BLD: 7 % (ref 12–49)
MAGNESIUM SERPL-MCNC: 2.4 MG/DL (ref 1.6–2.4)
MCH RBC QN AUTO: 30.4 PG (ref 26–34)
MCHC RBC AUTO-ENTMCNC: 31.7 G/DL (ref 30–36.5)
MCV RBC AUTO: 95.9 FL (ref 80–99)
MONOCYTES # BLD: 0.1 K/UL (ref 0–1)
MONOCYTES NFR BLD: 2 % (ref 5–13)
NEUTS SEG # BLD: 5.5 K/UL (ref 1.8–8)
NEUTS SEG NFR BLD: 90 % (ref 32–75)
NRBC # BLD: 0 K/UL (ref 0–0.01)
NRBC BLD-RTO: 0 PER 100 WBC
PLATELET # BLD AUTO: 219 K/UL (ref 150–400)
PMV BLD AUTO: 10.5 FL (ref 8.9–12.9)
POTASSIUM SERPL-SCNC: 3.6 MMOL/L (ref 3.5–5.1)
PROT SERPL-MCNC: 8.2 G/DL (ref 6.4–8.2)
RBC # BLD AUTO: 4.14 M/UL (ref 3.8–5.2)
RBC MORPH BLD: ABNORMAL
SAMPLES BEING HELD,HOLD: NORMAL
SODIUM SERPL-SCNC: 140 MMOL/L (ref 136–145)
TROPONIN I SERPL-MCNC: <0.05 NG/ML
WBC # BLD AUTO: 6.1 K/UL (ref 3.6–11)

## 2020-11-22 PROCEDURE — 36415 COLL VENOUS BLD VENIPUNCTURE: CPT

## 2020-11-22 PROCEDURE — 71045 X-RAY EXAM CHEST 1 VIEW: CPT

## 2020-11-22 PROCEDURE — 99284 EMERGENCY DEPT VISIT MOD MDM: CPT

## 2020-11-22 PROCEDURE — 74011000636 HC RX REV CODE- 636: Performed by: RADIOLOGY

## 2020-11-22 PROCEDURE — 74011250636 HC RX REV CODE- 250/636: Performed by: EMERGENCY MEDICINE

## 2020-11-22 PROCEDURE — 74177 CT ABD & PELVIS W/CONTRAST: CPT

## 2020-11-22 PROCEDURE — 83735 ASSAY OF MAGNESIUM: CPT

## 2020-11-22 PROCEDURE — 96374 THER/PROPH/DIAG INJ IV PUSH: CPT

## 2020-11-22 PROCEDURE — 84484 ASSAY OF TROPONIN QUANT: CPT

## 2020-11-22 PROCEDURE — 83690 ASSAY OF LIPASE: CPT

## 2020-11-22 PROCEDURE — 96376 TX/PRO/DX INJ SAME DRUG ADON: CPT

## 2020-11-22 PROCEDURE — 96361 HYDRATE IV INFUSION ADD-ON: CPT

## 2020-11-22 PROCEDURE — 85025 COMPLETE CBC W/AUTO DIFF WBC: CPT

## 2020-11-22 PROCEDURE — 74011000258 HC RX REV CODE- 258: Performed by: RADIOLOGY

## 2020-11-22 PROCEDURE — 80053 COMPREHEN METABOLIC PANEL: CPT

## 2020-11-22 PROCEDURE — 83605 ASSAY OF LACTIC ACID: CPT

## 2020-11-22 RX ORDER — SODIUM CHLORIDE 0.9 % (FLUSH) 0.9 %
10 SYRINGE (ML) INJECTION
Status: COMPLETED | OUTPATIENT
Start: 2020-11-22 | End: 2020-11-22

## 2020-11-22 RX ORDER — ONDANSETRON 4 MG/1
4 TABLET, ORALLY DISINTEGRATING ORAL
Qty: 20 TAB | Refills: 0 | Status: SHIPPED | OUTPATIENT
Start: 2020-11-22 | End: 2021-08-05 | Stop reason: ALTCHOICE

## 2020-11-22 RX ORDER — NAPROXEN 500 MG/1
500 TABLET ORAL 2 TIMES DAILY WITH MEALS
Qty: 20 TAB | Refills: 0 | Status: SHIPPED | OUTPATIENT
Start: 2020-11-22 | End: 2020-12-02

## 2020-11-22 RX ORDER — ONDANSETRON 2 MG/ML
4 INJECTION INTRAMUSCULAR; INTRAVENOUS ONCE
Status: COMPLETED | OUTPATIENT
Start: 2020-11-22 | End: 2020-11-22

## 2020-11-22 RX ADMIN — Medication 10 ML: at 09:11

## 2020-11-22 RX ADMIN — IOPAMIDOL 100 ML: 755 INJECTION, SOLUTION INTRAVENOUS at 09:11

## 2020-11-22 RX ADMIN — SODIUM CHLORIDE 1000 ML: 9 INJECTION, SOLUTION INTRAVENOUS at 07:47

## 2020-11-22 RX ADMIN — ONDANSETRON 4 MG: 2 INJECTION INTRAMUSCULAR; INTRAVENOUS at 10:08

## 2020-11-22 RX ADMIN — SODIUM CHLORIDE 100 ML: 900 INJECTION, SOLUTION INTRAVENOUS at 09:12

## 2020-11-22 RX ADMIN — ONDANSETRON 4 MG: 2 INJECTION INTRAMUSCULAR; INTRAVENOUS at 07:47

## 2020-11-22 NOTE — ED TRIAGE NOTES
Pt arrives ambulatory to ED w/ CC 8/10 lower abdominal pain and vomiting. Right inguinal hernia present, slightly tender to palpation. States she has had it for [employer]. \" Pt states she has also been vomiting since 2am. Currently denies nausea. VSS, pt A & O x 4.

## 2020-11-22 NOTE — ED NOTES
Pt slowly working on PO challenge. Pt taking few sips ice water and ate two crackers. Tolerating well.

## 2020-11-22 NOTE — CONSULTS
Surgery Consult    Subjective:      Nancy Lindsey is a 56 y.o. female who presents complaining of lower abdominal pain and vomiting. The patient has a known right inguinal hernia which she was evaluated about 4 years ago for. He was actually evaluated twice. For some reason she did not have surgery. She states that she began having lower abdominal pain with nausea vomiting this morning. She also complains of pain in her right groin. Did have a bowel movement within the last 24 hours though she states she did have to do some straining and had some blood on her toilet paper.     Patient Active Problem List    Diagnosis Date Noted    Benign essential HTN 2018    Seasonal allergic rhinitis 10/06/2014     Past Medical History:   Diagnosis Date    Adult acne     Arthritis     History of colonic polyps      study normal, f/u 5 years [facility])    Hypertension     HISTORY NOT TAKING ANY MEDS CURRENTLY    Lipoma     right upper extremity    Post-menopausal     Right inguinal hernia 3/16/2016    Wears dentures       Past Surgical History:   Procedure Laterality Date    HX COLONOSCOPY      due  (polyp)    HX HYSTERECTOMY      no cervix ovaries left in - done for menorrhagia    HX TONSILLECTOMY        Social History     Tobacco Use    Smoking status: Former Smoker     Packs/day: 0.00     Years: 4.00     Pack years: 0.00     Types: Cigarettes     Last attempt to quit: 1991     Years since quittin.9    Smokeless tobacco: Never Used    Tobacco comment: SMOKED 1 CIGARRETTE DAILY FOR 4 YEARS   Substance Use Topics    Alcohol use: No     Alcohol/week: 0.0 standard drinks      Family History   Problem Relation Age of Onset    Alcohol abuse Father     Liver Disease Father         cirrhosis    Stroke Maternal Grandmother     Prostate Cancer Maternal Grandfather     Heart Disease Mother         Has Debrillator    Hypertension Mother     Pacemaker Mother    [facility] Home Problems Neg Hx       No current facility-administered medications for this encounter. Current Outpatient Medications   Medication Sig    naproxen (NAPROSYN) 500 mg tablet Take 1 Tab by mouth two (2) times daily (with meals).  ibuprofen (MOTRIN) 600 mg tablet Take 1 Tab by mouth every six (6) hours as needed for Pain.  MULTIVITAMIN PO Take  by mouth daily. Allergies   Allergen Reactions    Pcn [Penicillins] Hives     itching        Review of Systems:    Pertinent items are noted in the History of Present Illness. Objective:        Visit Vitals  BP (!) 162/100 (BP 1 Location: Right arm, BP Patient Position: At rest)   Pulse 66   Temp 97.3 °F (36.3 °C)   Resp 14   Ht 5' 9\" (1.753 m)   Wt 75.4 kg (166 lb 3.6 oz)   SpO2 99%   BMI 24.55 kg/m²       Physical Exam:  GENERAL: alert, cooperative, no distress, appears stated age, EYE: negative, THROAT & NECK: normal, LUNG: clear to auscultation bilaterally, HEART: regular rate and rhythm, ABDOMEN: Soft nontender nondistended there is an easily reducible right inguinal hernia, EXTREMITIES:  no edema, SKIN: Normal., NEUROLOGIC: negative, PSYCH: non focal    Imaging:  images and reports reviewed  CT- Small bowel obstruction due to a right inguinal hernia containing a portion of  small and large bowel. Small amount of free fluid. No free air. Lab/Data Review: All lab results for the last 24 hours reviewed. Recent Results (from the past 24 hour(s))   SAMPLES BEING HELD    Collection Time: 11/22/20  7:45 AM   Result Value Ref Range    SAMPLES BEING HELD 1red,1blu     COMMENT        Add-on orders for these samples will be processed based on acceptable specimen integrity and analyte stability, which may vary by analyte.    LACTIC ACID    Collection Time: 11/22/20  7:50 AM   Result Value Ref Range    Lactic acid 1.4 0.4 - 2.0 MMOL/L   CBC WITH AUTOMATED DIFF    Collection Time: 11/22/20  7:51 AM   Result Value Ref Range    WBC 6.1 3.6 - 11.0 K/uL    RBC 4.14 3.80 - 5.20 M/uL    HGB 12.6 11.5 - 16.0 g/dL    HCT 39.7 35.0 - 47.0 %    MCV 95.9 80.0 - 99.0 FL    MCH 30.4 26.0 - 34.0 PG    MCHC 31.7 30.0 - 36.5 g/dL    RDW 13.2 11.5 - 14.5 %    PLATELET 188 629 - 900 K/uL    MPV 10.5 8.9 - 12.9 FL    NRBC 0.0 0  WBC    ABSOLUTE NRBC 0.00 0.00 - 0.01 K/uL    NEUTROPHILS 90 (H) 32 - 75 %    LYMPHOCYTES 7 (L) 12 - 49 %    MONOCYTES 2 (L) 5 - 13 %    EOSINOPHILS 0 0 - 7 %    BASOPHILS 0 0 - 1 %    IMMATURE GRANULOCYTES 1 (H) 0.0 - 0.5 %    ABS. NEUTROPHILS 5.5 1.8 - 8.0 K/UL    ABS. LYMPHOCYTES 0.4 (L) 0.8 - 3.5 K/UL    ABS. MONOCYTES 0.1 0.0 - 1.0 K/UL    ABS. EOSINOPHILS 0.0 0.0 - 0.4 K/UL    ABS. BASOPHILS 0.0 0.0 - 0.1 K/UL    ABS. IMM. GRANS. 0.1 (H) 0.00 - 0.04 K/UL    DF SMEAR SCANNED      RBC COMMENTS NORMOCYTIC, NORMOCHROMIC     METABOLIC PANEL, COMPREHENSIVE    Collection Time: 11/22/20  7:51 AM   Result Value Ref Range    Sodium 140 136 - 145 mmol/L    Potassium 3.6 3.5 - 5.1 mmol/L    Chloride 107 97 - 108 mmol/L    CO2 27 21 - 32 mmol/L    Anion gap 6 5 - 15 mmol/L    Glucose 146 (H) 65 - 100 mg/dL    BUN 15 6 - 20 MG/DL    Creatinine 1.01 0.55 - 1.02 MG/DL    BUN/Creatinine ratio 15 12 - 20      GFR est AA >60 >60 ml/min/1.73m2    GFR est non-AA 56 (L) >60 ml/min/1.73m2    Calcium 9.3 8.5 - 10.1 MG/DL    Bilirubin, total 0.7 0.2 - 1.0 MG/DL    ALT (SGPT) 15 12 - 78 U/L    AST (SGOT) 13 (L) 15 - 37 U/L    Alk.  phosphatase 116 45 - 117 U/L    Protein, total 8.2 6.4 - 8.2 g/dL    Albumin 4.2 3.5 - 5.0 g/dL    Globulin 4.0 2.0 - 4.0 g/dL    A-G Ratio 1.1 1.1 - 2.2     TROPONIN I    Collection Time: 11/22/20  7:51 AM   Result Value Ref Range    Troponin-I, Qt. <0.05 <0.05 ng/mL   LIPASE    Collection Time: 11/22/20  7:51 AM   Result Value Ref Range    Lipase 86 73 - 393 U/L   MAGNESIUM    Collection Time: 11/22/20  7:51 AM   Result Value Ref Range    Magnesium 2.4 1.6 - 2.4 mg/dL       Assessment:plan   Right inguinal hernia-while on CT scan it was showing an obstruction this was easily reducible physical examination. Would recommend p.o. challenge and if she is able to tolerate okay to discharge home with outpatient follow-up for elective repair. If it is not able to eat she may require more urgent operative intervention.

## 2020-11-22 NOTE — ED PROVIDER NOTES
The history is provided by the patient. Abdominal Pain    This is a recurrent problem. The current episode started 6 to 12 hours ago. The problem occurs constantly. The problem has been gradually worsening. The pain is associated with vomiting and eating. The pain is located in the RLQ. The quality of the pain is pressure-like and colicky. The pain is moderate. Associated symptoms include anorexia, nausea, vomiting and constipation. Pertinent negatives include no fever, no dysuria, no frequency and no hematuria. The pain is worsened by eating. The pain is relieved by nothing.         Past Medical History:   Diagnosis Date    Adult acne     Arthritis     History of colonic polyps     2014 study normal, f/u 5 years Dayan Fatima)    Hypertension     HISTORY NOT TAKING ANY MEDS CURRENTLY    Lipoma     right upper extremity    Post-menopausal     Right inguinal hernia 3/16/2016    Wears dentures        Past Surgical History:   Procedure Laterality Date    HX COLONOSCOPY  2014    due 2019 (polyp)    HX HYSTERECTOMY  2003    no cervix ovaries left in - done for menorrhagia    HX TONSILLECTOMY           Family History:   Problem Relation Age of Onset    Alcohol abuse Father     Liver Disease Father         cirrhosis    Stroke Maternal Grandmother     Prostate Cancer Maternal Grandfather     Heart Disease Mother         Has Debrillator    Hypertension Mother     Pacemaker Mother     Anesth Problems Neg Hx        Social History     Socioeconomic History    Marital status: SINGLE     Spouse name: Not on file    Number of children: Not on file    Years of education: Not on file    Highest education level: Not on file   Occupational History    Occupation: [employer] resource strain: Not on file    Food insecurity     Worry: Not on file     Inability: Not on file   [employer] needs     Medical: Not on file     Non-medical: Not on file   Tobacco Use    Smoking status: Former Smoker     Packs/day: 0.00     Years: 4.00     Pack years: 0.00     Types: Cigarettes     Last attempt to quit: 1991     Years since quittin.9    Smokeless tobacco: Never Used    Tobacco comment: SMOKED 1 CIGARRETTE DAILY FOR 4 YEARS   Substance and Sexual Activity    Alcohol use: No     Alcohol/week: 0.0 standard drinks    Drug use: No    Sexual activity: Never   Lifestyle    Physical activity     Days per week: Not on file     Minutes per session: Not on file    Stress: Not on file   Relationships    Social connections     Talks on phone: Not on file     Gets together: Not on file     Attends Voodoo service: Not on file     Active member of club or organization: Not on file     Attends meetings of clubs or organizations: Not on file     Relationship status: Not on file    Intimate partner violence     Fear of current or ex partner: Not on file     Emotionally abused: Not on file     Physically abused: Not on file     Forced sexual activity: Not on file   Other Topics Concern     Service No    Blood Transfusions No    Caffeine Concern Yes     Comment: 0-1 cup daily (previously 1-2 per day)    Occupational Exposure No    Hobby Hazards Not Asked    Sleep Concern Yes     Comment: restless sleep    Stress Concern No    Weight Concern No    Special Diet No    Back Care Not Asked    Exercise Yes     Comment: walks routinely    Bike Helmet Not Asked    Seat Belt Not Asked    Self-Exams Not Asked   Social History Narrative    Single, has two children (son and daughter). [facility]. ALLERGIES: Pcn [penicillins]    Review of Systems   Constitutional: Negative for fever. Gastrointestinal: Positive for abdominal pain, anorexia, constipation, nausea and vomiting. Genitourinary: Negative for dysuria, frequency and hematuria. All other systems reviewed and are negative.       Vitals:    20 0637 20 0756 20 0800   BP: (!) 156/82 (!) 162/100   Pulse: 80  66   Resp: 14  14   Temp: 97.3 °F (36.3 °C)     SpO2: 99% 99% 99%   Weight: 75.4 kg (166 lb 3.6 oz)     Height: 5' 9\" (1.753 m)              Physical Exam  Vitals signs and nursing note reviewed. Constitutional:       Appearance: She is well-developed. She is ill-appearing. HENT:      Head: Normocephalic and atraumatic. Cardiovascular:      Rate and Rhythm: Normal rate. Pulmonary:      Effort: Pulmonary effort is normal.      Breath sounds: Normal breath sounds. Abdominal:      General: Abdomen is flat. Palpations: Abdomen is soft. Neurological:      Mental Status: She is alert. MDM  Number of Diagnoses or Management Options  Incarcerated right inguinal hernia: new and requires workup  Small bowel obstruction [facility]): new and requires workup     Amount and/or Complexity of Data Reviewed  Clinical lab tests: reviewed  Tests in the radiology section of CPT®: reviewed  Obtain history from someone other than the patient: yes  Discuss the patient with other providers: yes    Risk of Complications, Morbidity, and/or Mortality  Presenting problems: moderate  Diagnostic procedures: moderate  Management options: moderate      ED Course as of Nov 22 1556   Sun Nov 22, 2020   0739 CT ABD PELV W CONT [KS]   8279 8:32 AM Improved after fluids and zofran. Exam reveals 5x6cm firm mass in the right inguinal area. The mass is firm and tender to palpation. Patient says this has been out like this for a year but has never hurt this bad. [KS]   2064 12:17 PM  Patient took ginger ale, crackers and feels well enough to go home.    [KS]      ED Course User Index  [KS] Duard Najjar, MD             Perfect Serve Consult for Admission  10:05 AM      ED Room Number: XJ81/06  Patient Name and age:  Valentina Rodriguez 56 y.o.  female  Working Diagnosis:   1. Small bowel obstruction ([address]    2.  Incarcerated right inguinal hernia        COVID-19 Suspicion:  no  Sepsis present:  no Reassessment needed: yes  Code Status:  Full Code  Readmission: no  Isolation Requirements:  no  Recommended Level of Care:  med/surg  Department:[facility] Adult ED - 21   Other: Right inguinal hernia present for 1 year with intermittent episodes of pain. Approximately 1 AM the patient had the acute onset of severe pain associated with nausea and vomiting. CT IMPRESSION:  Small bowel obstruction due to a right inguinal hernia containing a portion of  small and large bowel. Small amount of free fluid. No free air. Dr. Nathaniel Oliveros evaluated patient in ED and subsequently reduced hernia. LABORATORY TESTS:  Recent Results (from the past 12 hour(s))   SAMPLES BEING HELD    Collection Time: 11/22/20  7:45 AM   Result Value Ref Range    SAMPLES BEING HELD 1red,1blu     COMMENT        Add-on orders for these samples will be processed based on acceptable specimen integrity and analyte stability, which may vary by analyte. LACTIC ACID    Collection Time: 11/22/20  7:50 AM   Result Value Ref Range    Lactic acid 1.4 0.4 - 2.0 MMOL/L   CBC WITH AUTOMATED DIFF    Collection Time: 11/22/20  7:51 AM   Result Value Ref Range    WBC 6.1 3.6 - 11.0 K/uL    RBC 4.14 3.80 - 5.20 M/uL    HGB 12.6 11.5 - 16.0 g/dL    HCT 39.7 35.0 - 47.0 %    MCV 95.9 80.0 - 99.0 FL    MCH 30.4 26.0 - 34.0 PG    MCHC 31.7 30.0 - 36.5 g/dL    RDW 13.2 11.5 - 14.5 %    PLATELET 781 903 - 498 K/uL    MPV 10.5 8.9 - 12.9 FL    NRBC 0.0 0  WBC    ABSOLUTE NRBC 0.00 0.00 - 0.01 K/uL    NEUTROPHILS 90 (H) 32 - 75 %    LYMPHOCYTES 7 (L) 12 - 49 %    MONOCYTES 2 (L) 5 - 13 %    EOSINOPHILS 0 0 - 7 %    BASOPHILS 0 0 - 1 %    IMMATURE GRANULOCYTES 1 (H) 0.0 - 0.5 %    ABS. NEUTROPHILS 5.5 1.8 - 8.0 K/UL    ABS. LYMPHOCYTES 0.4 (L) 0.8 - 3.5 K/UL    ABS. MONOCYTES 0.1 0.0 - 1.0 K/UL    ABS. EOSINOPHILS 0.0 0.0 - 0.4 K/UL    ABS. BASOPHILS 0.0 0.0 - 0.1 K/UL    ABS. IMM.  GRANS. 0.1 (H) 0.00 - 0.04 K/UL    DF SMEAR SCANNED      RBC COMMENTS NORMOCYTIC, NORMOCHROMIC     METABOLIC PANEL, COMPREHENSIVE    Collection Time: 11/22/20  7:51 AM   Result Value Ref Range    Sodium 140 136 - 145 mmol/L    Potassium 3.6 3.5 - 5.1 mmol/L    Chloride 107 97 - 108 mmol/L    CO2 27 21 - 32 mmol/L    Anion gap 6 5 - 15 mmol/L    Glucose 146 (H) 65 - 100 mg/dL    BUN 15 6 - 20 MG/DL    Creatinine 1.01 0.55 - 1.02 MG/DL    BUN/Creatinine ratio 15 12 - 20      GFR est AA >60 >60 ml/min/1.73m2    GFR est non-AA 56 (L) >60 ml/min/1.73m2    Calcium 9.3 8.5 - 10.1 MG/DL    Bilirubin, total 0.7 0.2 - 1.0 MG/DL    ALT (SGPT) 15 12 - 78 U/L    AST (SGOT) 13 (L) 15 - 37 U/L    Alk. phosphatase 116 45 - 117 U/L    Protein, total 8.2 6.4 - 8.2 g/dL    Albumin 4.2 3.5 - 5.0 g/dL    Globulin 4.0 2.0 - 4.0 g/dL    A-G Ratio 1.1 1.1 - 2.2     TROPONIN I    Collection Time: 11/22/20  7:51 AM   Result Value Ref Range    Troponin-I, Qt. <0.05 <0.05 ng/mL   LIPASE    Collection Time: 11/22/20  7:51 AM   Result Value Ref Range    Lipase 86 73 - 393 U/L   MAGNESIUM    Collection Time: 11/22/20  7:51 AM   Result Value Ref Range    Magnesium 2.4 1.6 - 2.4 mg/dL       IMAGING RESULTS:    MEDICATIONS GIVEN:  Medications   ondansetron (ZOFRAN) injection 4 mg (4 mg IntraVENous Given 11/22/20 0747)   sodium chloride 0.9 % bolus infusion 1,000 mL (0 mL IntraVENous IV Completed 11/22/20 1113)   sodium chloride 0.9 % bolus infusion 100 mL (0 mL IntraVENous IV Completed 11/22/20 1113)   iopamidoL (ISOVUE-370) 76 % injection 100 mL (100 mL IntraVENous Given 11/22/20 0911)   sodium chloride (NS) flush 10 mL (10 mL IntraVENous Given 11/22/20 0911)   ondansetron (ZOFRAN) injection 4 mg (4 mg IntraVENous Given 11/22/20 1008)       IMPRESSION:  1. Small bowel obstruction ([address]    2. Incarcerated right inguinal hernia        PLAN:  1. Hernia successfully reduced by Surgeon. Patient tolerated PO  2. Discharged to home  3.  Return to ED if worse

## 2020-11-22 NOTE — DISCHARGE INSTRUCTIONS
Dr. Kenton Cage (Surgeon) successfully reduced your hernia. He has recommended you have a surgery to repair. Return to the Emergency Department if you get worse or have further problems.

## 2020-11-22 NOTE — ED NOTES
Pt had one episode of vomiting in triage. Pt brought back to room 13. Zofran given, see MAR for details.

## 2020-11-23 ENCOUNTER — OFFICE VISIT (OUTPATIENT)
Dept: SURGERY | Age: 60
End: 2020-11-23
Payer: COMMERCIAL

## 2020-11-23 VITALS
SYSTOLIC BLOOD PRESSURE: 108 MMHG | DIASTOLIC BLOOD PRESSURE: 74 MMHG | OXYGEN SATURATION: 98 % | TEMPERATURE: 99.3 F | WEIGHT: 166 LBS | HEART RATE: 67 BPM | HEIGHT: 69 IN | RESPIRATION RATE: 16 BRPM | BODY MASS INDEX: 24.59 KG/M2

## 2020-11-23 DIAGNOSIS — K40.30: Primary | ICD-10-CM

## 2020-11-23 PROCEDURE — 99202 OFFICE O/P NEW SF 15 MIN: CPT | Performed by: SURGERY

## 2020-11-23 NOTE — PROGRESS NOTES
1. Have you been to the ER, urgent care clinic since your last visit? Hospitalized since your last visit? 11/22/20 [facility] ED for hernia right groin. 2. Have you seen or consulted any other health care providers outside of the [address] [place] [address] since your last visit? Include any pap smears or colon screening.  no

## 2020-11-23 NOTE — LETTER
11/23/20 Patient: Clemetine Clonts YOB: 1960 Date of Visit: 11/23/2020 Barbara Ling MD 
[id number] [id number] VIA In Basket Dear Barbara Ling MD, Thank you for referring Ms. Gay Locke to [facility] for evaluation. My notes for this consultation are attached. If you have questions, please do not hesitate to call me. I look forward to following your patient along with you. Sincerely, Ramses Avila MD

## 2020-11-23 NOTE — PROGRESS NOTES
Subjective:      Marge Saleh  is a 56 y.o. female presents for evaluation of RIGHT inguinal hernia. Pt previously seen in 2016 for evaluation of asymptomatic RIGHT inguinal hernia. Pt first noticed hernia about 5 months prior and denied any pain or changes in size and decided to wait for surgical repair. She presented to the ED on 20 c/o moderate and gradually worsening RIGHT lower abdomainal pain and vomiting. Pt noted pain was made worse with eating. CT scan at this time showed SBO due to RIGHT inguinal hernia containing portion of small and large bowel. Hernia was reduced, symptoms improved and pt was discharged home. Today, pt reports hernia is  from her recent SBO and is interested in having it repaired. Pt has been able to move bowels without difficulty and is tolerating a normal diet.      Past Medical History:   Diagnosis Date    Adult acne     Arthritis     History of colonic polyps      study normal, f/u 5 years Phyllistine Gravely)    Hypertension     HISTORY NOT TAKING ANY MEDS CURRENTLY    Intestinal obstruction without gangrene due to right inguinal hernia 2020    Lipoma     right upper extremity    Post-menopausal     Right inguinal hernia 3/16/2016    Wears dentures        Past Surgical History:   Procedure Laterality Date    HX COLONOSCOPY      due  (polyp)    HX HYSTERECTOMY      no cervix ovaries left in - done for menorrhagia    HX TONSILLECTOMY         Social History     Tobacco Use    Smoking status: Former Smoker     Packs/day: 0.00     Years: 4.00     Pack years: 0.00     Types: Cigarettes     Last attempt to quit: 1991     Years since quittin.9    Smokeless tobacco: Never Used    Tobacco comment: SMOKED 1 CIGARRETTE DAILY FOR 4 YEARS   Substance Use Topics    Alcohol use: No     Alcohol/week: 0.0 standard drinks       Family History   Problem Relation Age of Onset    Alcohol abuse Father     Liver Disease Father cirrhosis    Stroke Maternal Grandmother     Prostate Cancer Maternal Grandfather     Heart Disease Mother         Has Debrillator    Hypertension Mother     Pacemaker Mother     Anesth Problems Neg Hx        Current Outpatient Medications on File Prior to Visit   Medication Sig Dispense Refill    ondansetron (Zofran ODT) 4 mg disintegrating tablet 1 Tab by SubLINGual route every eight (8) hours as needed for Nausea or Vomiting. 20 Tab 0    MULTIVITAMIN PO Take  by mouth daily.  naproxen (Naprosyn) 500 mg tablet Take 1 Tab by mouth two (2) times daily (with meals) for 10 days. 20 Tab 0    ibuprofen (MOTRIN) 600 mg tablet Take 1 Tab by mouth every six (6) hours as needed for Pain. 20 Tab 0     No current facility-administered medications on file prior to visit. Allergies   Allergen Reactions    Pcn [Penicillins] Hives     itching         Review of Systems:    A comprehensive review of systems was negative except for that written in the History of Present Illness. Objective:     Visit Vitals  /74   Pulse 67   Temp 99.3 °F (37.4 °C) (Oral)   Resp 16   Ht 5' 9\" (1.753 m)   Wt 166 lb (75.3 kg)   SpO2 98%   BMI 24.51 kg/m²        Physical Exam:  GENERAL: alert, cooperative, no distress, appears stated age  LUNG: clear to auscultation bilaterally  HEART: regular rate and rhythm, S1, S2 normal, no murmur, click, rub or gallop  ABDOMEN: Soft, non-tender, normal bowel sounds. Easily reducible RIGHT inguinal hernia. Labs: No results found for this or any previous visit (from the past 24 hour(s)). Data Review:      CT abd pel with contrast 11/22/20:  GI TRACT: Fluid-filled mildly dilated small bowel loops throughout the abdomen leading to a transition point at a small right inguinal hernia. IMPRESSION:  Small bowel obstruction due to a right inguinal hernia containing a portion of small and large bowel. Small amount of free fluid. No free air.     Assessment and Plan:       ICD-10-CM ICD-9-CM    1. Intestinal obstruction without gangrene due to right inguinal hernia  K40.30 550.10        I discussed their diagnosis thoroughly. Noted that the presence of a hernia is not a determining factor when considering surgical repair. Due to the natural progression of a hernia, this will not heal on its own and may continue to increase in size over time. Since this hernia is bothersome, recommend surgical repair as an outpatient, with possible mesh placement. Described the details of this surgery and discussed what the patient should expect for recovery. Pt should avoid any heavy lifting for 10-14 days post-operation. All questions were answered. They agree with this plan and will schedule this at their convenience. The patient was counseled at length about the risks of norma Covid-19 during their perioperative period and any recovery window from their procedure. The patient was made aware that norma Covid-19  may worsen their prognosis for recovering from their procedure and lend to a higher morbidity and/or mortality risk. All material risks, benefits, and reasonable alternatives including postponing the procedure were discussed. The patient does  wish to proceed with the procedure at this time. Total face to face time with patient: 20 minutes. Greater than 50% of the time was spent in counseling. This document was scribed by Bev Guzman as dictated by Dr. Greta Dyson.      Signed By: Ramses Avila MD     11/23/20

## 2020-11-23 NOTE — H&P (VIEW-ONLY)
Subjective:  
  
Marge Saleh  is a 56 y.o. female presents for evaluation of RIGHT inguinal hernia. Pt previously seen in 2016 for evaluation of asymptomatic RIGHT inguinal hernia. Pt first noticed hernia about 5 months prior and denied any pain or changes in size and decided to wait for surgical repair. She presented to the ED on 20 c/o moderate and gradually worsening RIGHT lower abdomainal pain and vomiting. Pt noted pain was made worse with eating. CT scan at this time showed SBO due to RIGHT inguinal hernia containing portion of small and large bowel. Hernia was reduced, symptoms improved and pt was discharged home. Today, pt reports hernia is  from her recent SBO and is interested in having it repaired. Pt has been able to move bowels without difficulty and is tolerating a normal diet. Past Medical History:  
Diagnosis Date  Adult acne  Arthritis  History of colonic polyps  study normal, f/u 5 years Phyllistine Gravely)  Hypertension HISTORY NOT TAKING ANY MEDS CURRENTLY  Intestinal obstruction without gangrene due to right inguinal hernia 2020  Lipoma   
 right upper extremity  Post-menopausal   
 Right inguinal hernia 3/16/2016  Wears dentures Past Surgical History:  
Procedure Laterality Date  HX COLONOSCOPY    
 due  (polyp)  HX HYSTERECTOMY    
 no cervix ovaries left in - done for menorrhagia  HX TONSILLECTOMY Social History Tobacco Use  Smoking status: Former Smoker Packs/day: 0.00 Years: 4.00 Pack years: 0.00 Types: Cigarettes Last attempt to quit: 1991 Years since quittin.9  Smokeless tobacco: Never Used  Tobacco comment: SMOKED 1 CIGARRETTE DAILY FOR 4 YEARS Substance Use Topics  Alcohol use: No  
  Alcohol/week: 0.0 standard drinks Family History Problem Relation Age of Onset  Alcohol abuse Father  Liver Disease Father cirrhosis  Stroke Maternal Grandmother  Prostate Cancer Maternal Grandfather  Heart Disease Mother Has Debrillator  Hypertension Mother  Pacemaker Mother  Anesth Problems Neg Hx Current Outpatient Medications on File Prior to Visit Medication Sig Dispense Refill  ondansetron (Zofran ODT) 4 mg disintegrating tablet 1 Tab by SubLINGual route every eight (8) hours as needed for Nausea or Vomiting. 20 Tab 0  MULTIVITAMIN PO Take  by mouth daily.  naproxen (Naprosyn) 500 mg tablet Take 1 Tab by mouth two (2) times daily (with meals) for 10 days. 20 Tab 0  ibuprofen (MOTRIN) 600 mg tablet Take 1 Tab by mouth every six (6) hours as needed for Pain. 20 Tab 0 No current facility-administered medications on file prior to visit. Allergies Allergen Reactions  Pcn [Penicillins] Hives  
  itching Review of Systems: A comprehensive review of systems was negative except for that written in the History of Present Illness. Objective:  
 
Visit Vitals /74 Pulse 67 Temp 99.3 °F (37.4 °C) (Oral) Resp 16 Ht 5' 9\" (1.753 m) Wt 166 lb (75.3 kg) SpO2 98% BMI 24.51 kg/m² Physical Exam: 
GENERAL: alert, cooperative, no distress, appears stated age LUNG: clear to auscultation bilaterally HEART: regular rate and rhythm, S1, S2 normal, no murmur, click, rub or gallop ABDOMEN: Soft, non-tender, normal bowel sounds. Easily reducible RIGHT inguinal hernia. Labs: No results found for this or any previous visit (from the past 24 hour(s)). Data Review:   
 
CT abd pel with contrast 11/22/20: 
GI TRACT: Fluid-filled mildly dilated small bowel loops throughout the abdomen leading to a transition point at a small right inguinal hernia. IMPRESSION: 
Small bowel obstruction due to a right inguinal hernia containing a portion of small and large bowel. Small amount of free fluid. No free air. Assessment and Plan: ICD-10-CM ICD-9-CM 1. Intestinal obstruction without gangrene due to right inguinal hernia  K40.30 550.10 I discussed their diagnosis thoroughly. Noted that the presence of a hernia is not a determining factor when considering surgical repair. Due to the natural progression of a hernia, this will not heal on its own and may continue to increase in size over time. Since this hernia is bothersome, recommend surgical repair as an outpatient, with possible mesh placement. Described the details of this surgery and discussed what the patient should expect for recovery. Pt should avoid any heavy lifting for 10-14 days post-operation. All questions were answered. They agree with this plan and will schedule this at their convenience. The patient was counseled at length about the risks of norma Covid-19 during their perioperative period and any recovery window from their procedure. The patient was made aware that norma Covid-19  may worsen their prognosis for recovering from their procedure and lend to a higher morbidity and/or mortality risk. All material risks, benefits, and reasonable alternatives including postponing the procedure were discussed. The patient does  wish to proceed with the procedure at this time. Total face to face time with patient: 20 minutes. Greater than 50% of the time was spent in counseling. This document was scribed by Gucci Harden as dictated by Dr. Juan Sharpe. Signed By: Naomi Vance MD   
 11/23/20

## 2020-11-24 ENCOUNTER — TRANSCRIBE ORDER (OUTPATIENT)
Dept: REGISTRATION | Age: 60
End: 2020-11-24

## 2020-11-24 DIAGNOSIS — Z01.812 PRE-PROCEDURE LAB EXAM: Primary | ICD-10-CM

## 2020-11-25 ENCOUNTER — HOSPITAL ENCOUNTER (OUTPATIENT)
Dept: PREADMISSION TESTING | Age: 60
Discharge: HOME | End: 2020-11-25
Attending: SURGERY
Payer: COMMERCIAL

## 2020-11-25 DIAGNOSIS — Z01.812 PRE-PROCEDURE LAB EXAM: ICD-10-CM

## 2020-11-25 PROCEDURE — 87635 SARS-COV-2 COVID-19 AMP PRB: CPT

## 2020-11-25 NOTE — PERIOP NOTES
PAT instructions reviewed with patient and given the opportunity to ask questions. Patient instructed to purchase two bottles CHG soap, instructions for use reviewed with patient. Patient made aware of COVID 19 testing to be done 96  hours prior to surgery. Patient instructed to self quarantine between testing and arrival time day of surgery. Patient instructed re: check-in procedure for day of surgery.

## 2020-11-27 LAB — SARS-COV-2, COV2NT: NOT DETECTED

## 2020-11-30 ENCOUNTER — HOSPITAL ENCOUNTER (OUTPATIENT)
Age: 60
Setting detail: OUTPATIENT SURGERY
Discharge: HOME | End: 2020-11-30
Attending: SURGERY | Admitting: SURGERY
Payer: COMMERCIAL

## 2020-11-30 ENCOUNTER — ANESTHESIA EVENT (OUTPATIENT)
Dept: SURGERY | Age: 60
End: 2020-11-30
Payer: COMMERCIAL

## 2020-11-30 ENCOUNTER — ANESTHESIA (OUTPATIENT)
Dept: SURGERY | Age: 60
End: 2020-11-30
Payer: COMMERCIAL

## 2020-11-30 VITALS
RESPIRATION RATE: 18 BRPM | BODY MASS INDEX: 24.58 KG/M2 | WEIGHT: 165.98 LBS | HEIGHT: 69 IN | TEMPERATURE: 97.9 F | DIASTOLIC BLOOD PRESSURE: 83 MMHG | HEART RATE: 60 BPM | SYSTOLIC BLOOD PRESSURE: 131 MMHG | OXYGEN SATURATION: 98 %

## 2020-11-30 DIAGNOSIS — G89.18 POST-OP PAIN: Primary | ICD-10-CM

## 2020-11-30 PROCEDURE — 76010000138 HC OR TIME 0.5 TO 1 HR: Performed by: SURGERY

## 2020-11-30 PROCEDURE — 76210000021 HC REC RM PH II 0.5 TO 1 HR: Performed by: SURGERY

## 2020-11-30 PROCEDURE — 77030040361 HC SLV COMPR DVT MDII -B: Performed by: SURGERY

## 2020-11-30 PROCEDURE — C9290 INJ, BUPIVACAINE LIPOSOME: HCPCS | Performed by: SURGERY

## 2020-11-30 PROCEDURE — 74011250636 HC RX REV CODE- 250/636: Performed by: NURSE ANESTHETIST, CERTIFIED REGISTERED

## 2020-11-30 PROCEDURE — 74011250637 HC RX REV CODE- 250/637: Performed by: ANESTHESIOLOGY

## 2020-11-30 PROCEDURE — 74011000250 HC RX REV CODE- 250: Performed by: SURGERY

## 2020-11-30 PROCEDURE — 74011250636 HC RX REV CODE- 250/636: Performed by: SURGERY

## 2020-11-30 PROCEDURE — 49505 PRP I/HERN INIT REDUC >5 YR: CPT | Performed by: SURGERY

## 2020-11-30 PROCEDURE — 77030008684 HC TU ET CUF COVD -B: Performed by: ANESTHESIOLOGY

## 2020-11-30 PROCEDURE — 76210000017 HC OR PH I REC 1.5 TO 2 HR: Performed by: SURGERY

## 2020-11-30 PROCEDURE — C1781 MESH (IMPLANTABLE): HCPCS | Performed by: SURGERY

## 2020-11-30 PROCEDURE — 77030026438 HC STYL ET INTUB CARD -A: Performed by: ANESTHESIOLOGY

## 2020-11-30 PROCEDURE — 2709999900 HC NON-CHARGEABLE SUPPLY: Performed by: SURGERY

## 2020-11-30 PROCEDURE — 77030042556 HC PNCL CAUT -B: Performed by: SURGERY

## 2020-11-30 PROCEDURE — 74011250636 HC RX REV CODE- 250/636: Performed by: ANESTHESIOLOGY

## 2020-11-30 PROCEDURE — 76060000033 HC ANESTHESIA 1 TO 1.5 HR: Performed by: SURGERY

## 2020-11-30 PROCEDURE — 77030002933 HC SUT MCRYL J&J -A: Performed by: SURGERY

## 2020-11-30 PROCEDURE — 77030010507 HC ADH SKN DERMBND J&J -B: Performed by: SURGERY

## 2020-11-30 PROCEDURE — 74011000250 HC RX REV CODE- 250: Performed by: NURSE ANESTHETIST, CERTIFIED REGISTERED

## 2020-11-30 PROCEDURE — 88304 TISSUE EXAM BY PATHOLOGIST: CPT

## 2020-11-30 PROCEDURE — 77030031139 HC SUT VCRL2 J&J -A: Performed by: SURGERY

## 2020-11-30 DEVICE — PERFIX PLUG, 1.6" X 1.9" (4.1 CM X 4.8 CM), LARGE (CONTENTS: 2)
Type: IMPLANTABLE DEVICE | Site: GROIN | Status: FUNCTIONAL
Brand: PERFIX

## 2020-11-30 RX ORDER — ROCURONIUM BROMIDE 10 MG/ML
INJECTION, SOLUTION INTRAVENOUS AS NEEDED
Status: DISCONTINUED | OUTPATIENT
Start: 2020-11-30 | End: 2020-11-30 | Stop reason: HOSPADM

## 2020-11-30 RX ORDER — ONDANSETRON 2 MG/ML
4 INJECTION INTRAMUSCULAR; INTRAVENOUS AS NEEDED
Status: DISCONTINUED | OUTPATIENT
Start: 2020-11-30 | End: 2020-11-30 | Stop reason: HOSPADM

## 2020-11-30 RX ORDER — ACETAMINOPHEN 325 MG/1
650 TABLET ORAL ONCE
Status: COMPLETED | OUTPATIENT
Start: 2020-11-30 | End: 2020-11-30

## 2020-11-30 RX ORDER — DEXAMETHASONE SODIUM PHOSPHATE 4 MG/ML
INJECTION, SOLUTION INTRA-ARTICULAR; INTRALESIONAL; INTRAMUSCULAR; INTRAVENOUS; SOFT TISSUE AS NEEDED
Status: DISCONTINUED | OUTPATIENT
Start: 2020-11-30 | End: 2020-11-30 | Stop reason: HOSPADM

## 2020-11-30 RX ORDER — EPHEDRINE SULFATE/0.9% NACL/PF 50 MG/5 ML
SYRINGE (ML) INTRAVENOUS AS NEEDED
Status: DISCONTINUED | OUTPATIENT
Start: 2020-11-30 | End: 2020-11-30 | Stop reason: HOSPADM

## 2020-11-30 RX ORDER — BUPIVACAINE HYDROCHLORIDE AND EPINEPHRINE 5; 5 MG/ML; UG/ML
30 INJECTION, SOLUTION EPIDURAL; INTRACAUDAL; PERINEURAL ONCE
Status: COMPLETED | OUTPATIENT
Start: 2020-11-30 | End: 2020-11-30

## 2020-11-30 RX ORDER — SODIUM CHLORIDE 0.9 % (FLUSH) 0.9 %
5-40 SYRINGE (ML) INJECTION EVERY 8 HOURS
Status: DISCONTINUED | OUTPATIENT
Start: 2020-11-30 | End: 2020-11-30 | Stop reason: HOSPADM

## 2020-11-30 RX ORDER — FENTANYL CITRATE 50 UG/ML
25 INJECTION, SOLUTION INTRAMUSCULAR; INTRAVENOUS
Status: DISCONTINUED | OUTPATIENT
Start: 2020-11-30 | End: 2020-11-30 | Stop reason: HOSPADM

## 2020-11-30 RX ORDER — NEOSTIGMINE METHYLSULFATE 1 MG/ML
INJECTION, SOLUTION INTRAVENOUS AS NEEDED
Status: DISCONTINUED | OUTPATIENT
Start: 2020-11-30 | End: 2020-11-30 | Stop reason: HOSPADM

## 2020-11-30 RX ORDER — GLYCOPYRROLATE 0.2 MG/ML
INJECTION INTRAMUSCULAR; INTRAVENOUS AS NEEDED
Status: DISCONTINUED | OUTPATIENT
Start: 2020-11-30 | End: 2020-11-30 | Stop reason: HOSPADM

## 2020-11-30 RX ORDER — FENTANYL CITRATE 50 UG/ML
INJECTION, SOLUTION INTRAMUSCULAR; INTRAVENOUS AS NEEDED
Status: DISCONTINUED | OUTPATIENT
Start: 2020-11-30 | End: 2020-11-30 | Stop reason: HOSPADM

## 2020-11-30 RX ORDER — ONDANSETRON 2 MG/ML
INJECTION INTRAMUSCULAR; INTRAVENOUS AS NEEDED
Status: DISCONTINUED | OUTPATIENT
Start: 2020-11-30 | End: 2020-11-30 | Stop reason: HOSPADM

## 2020-11-30 RX ORDER — LIDOCAINE HYDROCHLORIDE 10 MG/ML
0.1 INJECTION, SOLUTION EPIDURAL; INFILTRATION; INTRACAUDAL; PERINEURAL AS NEEDED
Status: DISCONTINUED | OUTPATIENT
Start: 2020-11-30 | End: 2020-11-30 | Stop reason: HOSPADM

## 2020-11-30 RX ORDER — HYDROCODONE BITARTRATE AND ACETAMINOPHEN 5; 325 MG/1; MG/1
1 TABLET ORAL
Qty: 20 TAB | Refills: 0 | Status: SHIPPED | OUTPATIENT
Start: 2020-11-30 | End: 2020-12-05

## 2020-11-30 RX ORDER — SODIUM CHLORIDE, SODIUM LACTATE, POTASSIUM CHLORIDE, CALCIUM CHLORIDE 600; 310; 30; 20 MG/100ML; MG/100ML; MG/100ML; MG/100ML
50 INJECTION, SOLUTION INTRAVENOUS CONTINUOUS
Status: DISCONTINUED | OUTPATIENT
Start: 2020-11-30 | End: 2020-11-30 | Stop reason: HOSPADM

## 2020-11-30 RX ORDER — FENTANYL CITRATE 50 UG/ML
50 INJECTION, SOLUTION INTRAMUSCULAR; INTRAVENOUS AS NEEDED
Status: DISCONTINUED | OUTPATIENT
Start: 2020-11-30 | End: 2020-11-30 | Stop reason: HOSPADM

## 2020-11-30 RX ORDER — LIDOCAINE HYDROCHLORIDE 20 MG/ML
INJECTION, SOLUTION EPIDURAL; INFILTRATION; INTRACAUDAL; PERINEURAL AS NEEDED
Status: DISCONTINUED | OUTPATIENT
Start: 2020-11-30 | End: 2020-11-30 | Stop reason: HOSPADM

## 2020-11-30 RX ORDER — SODIUM CHLORIDE 0.9 % (FLUSH) 0.9 %
5-40 SYRINGE (ML) INJECTION AS NEEDED
Status: DISCONTINUED | OUTPATIENT
Start: 2020-11-30 | End: 2020-11-30 | Stop reason: HOSPADM

## 2020-11-30 RX ORDER — SODIUM CHLORIDE, SODIUM LACTATE, POTASSIUM CHLORIDE, CALCIUM CHLORIDE 600; 310; 30; 20 MG/100ML; MG/100ML; MG/100ML; MG/100ML
INJECTION, SOLUTION INTRAVENOUS
Status: DISCONTINUED | OUTPATIENT
Start: 2020-11-30 | End: 2020-11-30 | Stop reason: HOSPADM

## 2020-11-30 RX ORDER — MIDAZOLAM HYDROCHLORIDE 1 MG/ML
INJECTION, SOLUTION INTRAMUSCULAR; INTRAVENOUS AS NEEDED
Status: DISCONTINUED | OUTPATIENT
Start: 2020-11-30 | End: 2020-11-30 | Stop reason: HOSPADM

## 2020-11-30 RX ORDER — CEFAZOLIN SODIUM/WATER 2 G/20 ML
2 SYRINGE (ML) INTRAVENOUS ONCE
Status: COMPLETED | OUTPATIENT
Start: 2020-11-30 | End: 2020-11-30

## 2020-11-30 RX ORDER — PROPOFOL 10 MG/ML
INJECTION, EMULSION INTRAVENOUS AS NEEDED
Status: DISCONTINUED | OUTPATIENT
Start: 2020-11-30 | End: 2020-11-30 | Stop reason: HOSPADM

## 2020-11-30 RX ORDER — MIDAZOLAM HYDROCHLORIDE 1 MG/ML
1 INJECTION, SOLUTION INTRAMUSCULAR; INTRAVENOUS AS NEEDED
Status: DISCONTINUED | OUTPATIENT
Start: 2020-11-30 | End: 2020-11-30 | Stop reason: HOSPADM

## 2020-11-30 RX ORDER — HYDROMORPHONE HYDROCHLORIDE 2 MG/ML
INJECTION, SOLUTION INTRAMUSCULAR; INTRAVENOUS; SUBCUTANEOUS AS NEEDED
Status: DISCONTINUED | OUTPATIENT
Start: 2020-11-30 | End: 2020-11-30 | Stop reason: HOSPADM

## 2020-11-30 RX ORDER — HYDROMORPHONE HYDROCHLORIDE 1 MG/ML
0.2 INJECTION, SOLUTION INTRAMUSCULAR; INTRAVENOUS; SUBCUTANEOUS
Status: DISCONTINUED | OUTPATIENT
Start: 2020-11-30 | End: 2020-11-30 | Stop reason: HOSPADM

## 2020-11-30 RX ADMIN — SODIUM CHLORIDE, SODIUM LACTATE, POTASSIUM CHLORIDE, AND CALCIUM CHLORIDE 50 ML/HR: 600; 310; 30; 20 INJECTION, SOLUTION INTRAVENOUS at 09:43

## 2020-11-30 RX ADMIN — FENTANYL CITRATE 100 MCG: 50 INJECTION, SOLUTION INTRAMUSCULAR; INTRAVENOUS at 10:10

## 2020-11-30 RX ADMIN — ROCURONIUM BROMIDE 40 MG: 10 SOLUTION INTRAVENOUS at 10:10

## 2020-11-30 RX ADMIN — Medication 10 MG: at 10:24

## 2020-11-30 RX ADMIN — Medication 3.5 MG: at 10:53

## 2020-11-30 RX ADMIN — HYDROMORPHONE HYDROCHLORIDE 0.4 MG: 2 INJECTION, SOLUTION INTRAMUSCULAR; INTRAVENOUS; SUBCUTANEOUS at 10:44

## 2020-11-30 RX ADMIN — SODIUM CHLORIDE, POTASSIUM CHLORIDE, SODIUM LACTATE AND CALCIUM CHLORIDE: 600; 310; 30; 20 INJECTION, SOLUTION INTRAVENOUS at 10:01

## 2020-11-30 RX ADMIN — ONDANSETRON HYDROCHLORIDE 4 MG: 2 INJECTION, SOLUTION INTRAMUSCULAR; INTRAVENOUS at 10:22

## 2020-11-30 RX ADMIN — MIDAZOLAM 2 MG: 1 INJECTION INTRAMUSCULAR; INTRAVENOUS at 10:03

## 2020-11-30 RX ADMIN — DEXAMETHASONE SODIUM PHOSPHATE 6 MG: 4 INJECTION, SOLUTION INTRAMUSCULAR; INTRAVENOUS at 10:22

## 2020-11-30 RX ADMIN — Medication 2 G: at 10:15

## 2020-11-30 RX ADMIN — ACETAMINOPHEN 650 MG: 325 TABLET ORAL at 09:44

## 2020-11-30 RX ADMIN — LIDOCAINE HYDROCHLORIDE 100 MG: 20 INJECTION, SOLUTION EPIDURAL; INFILTRATION; INTRACAUDAL; PERINEURAL at 10:10

## 2020-11-30 RX ADMIN — FENTANYL CITRATE 50 MCG: 50 INJECTION, SOLUTION INTRAMUSCULAR; INTRAVENOUS at 10:38

## 2020-11-30 RX ADMIN — Medication 10 MG: at 10:49

## 2020-11-30 RX ADMIN — ONDANSETRON 4 MG: 2 INJECTION INTRAMUSCULAR; INTRAVENOUS at 11:36

## 2020-11-30 RX ADMIN — PROPOFOL 150 MG: 10 INJECTION, EMULSION INTRAVENOUS at 10:10

## 2020-11-30 RX ADMIN — GLYCOPYRROLATE 0.5 MG: 0.2 INJECTION, SOLUTION INTRAMUSCULAR; INTRAVENOUS at 10:52

## 2020-11-30 NOTE — ROUTINE PROCESS
Patient: Juliana Bui MRN: [id number]  SSN: [id number] YOB: 1960  Age: 56 y.o. Sex: female Patient is status post Procedure(s): 
REPAIR RIGHT INGUINAL HERNIA. Surgeon(s) and Role: Garfield Miller MD - Primary Local/Dose/Irrigation:  See [facility] Peripheral IV 11/30/20 Left;Posterior Hand (Active) Site Assessment Clean, dry, & intact 11/30/20 6744 Phlebitis Assessment 0 11/30/20 0942 Dressing Status Clean, dry, & intact; New 11/30/20 7441 Hub Color/Line Status Pink; Infusing 11/30/20 3046 Airway - Endotracheal Tube 11/30/20 Oral (Active) Dressing/Packing:  Wound Groin Right-Dressing/Treatment: Liquid /adhesive(Dermabond) (11/30/20 1050) Splint/Cast:  ] Other:  N/A

## 2020-11-30 NOTE — OP NOTES
[address]   OPERATIVE REPORT    Name:  Kaylah Boles  MR#:  [id number]  :  1960  ACCOUNT #:  [de-identified]  DATE OF SERVICE:  2020      PREOPERATIVE DIAGNOSIS:  Right inguinal hernia. POSTOPERATIVE DIAGNOSIS:  Right inguinal hernia. PROCEDURE PERFORMED:  Repair of right inguinal hernia with plug and patch. SURGEON:  Donato Harden MD    ASSISTANT:  Yuan Leggett    ANESTHESIA:  General supplemented with Exparel. COMPLICATIONS:  None. SPECIMENS REMOVED:  Round ligament with adjacent lipoma. IMPLANTS:  Large plug and patch. ESTIMATED BLOOD LOSS:  Minimal.    DRAINS:  None. FINDINGS:  A large indirect inguinal hernia. PROCEDURE:  With the patient supine and suitably anesthetized, the abdomen was prepared with ChloraPrep and draped as a field. Her previous Pfannenstiel incision was reopened which provided a good exposure of the inguinal canal.  The external ring was opened through the direction of its fibers of the external oblique and the contents of the hernia sac and the round ligament with its attached lipoma were dissected free. The lipoma and round ligament were  from the sac and ligated at their base and amputated. The sac was disimbricated and a large plug was placed and secured with a single 0 Vicryl suture. A patch was fashioned and secured to the pubic tubercle, shelving edge of Poupart's ligament and the conjoint tendon. The external oblique was closed over this with a running 0 Vicryl suture. Exparel was infiltrated everywhere except near the femoral nerve. The subcutaneous tissues were approximated with Vicryl and the skin was closed with subcuticular Monocryl followed by Dermabond. At the termination of the procedure, all counts were correct. The patient tolerated this well and was brought to the PACU in satisfactory condition.         Lalo Sierra MD      [id number]  D:  2020 11:16  T:  2020 13:51  JOB #:  [id number]  CC:  Miguel Angel Travis MD

## 2020-11-30 NOTE — BRIEF OP NOTE
Brief Postoperative Note    Patient: Rosalia Franco  YOB: 1960  MRN: [id number]    Date of Procedure: 11/30/2020     Pre-Op Diagnosis: RIGHT INGUINAL HERNIA    Post-Op Diagnosis: Same as preoperative diagnosis. Procedure(s):  REPAIR RIGHT INGUINAL HERNIA    Surgeon(s):  Román Cota MD    Surgical Assistant: Surg Asst-1: Edgar VEGA    Anesthesia: General     Estimated Blood Loss (mL): Minimal    Complications: None    Specimens:   ID Type Source Tests Collected by Time Destination   1 : Round ligament and lipoma Fresh Groin  Román Cota MD 11/30/2020 1037 Pathology        Implants:   Implant Name Type Inv. Item Serial No.  Lot No. LRB No. Used Action   MESH PLUG PERFIX 1.6X1. [employer] - SN/A  MESH PLUG PERFIX 1.6X1.9IN N/A [employer]_WD [id number] Right 1 Implanted       Drains: * No LDAs found *    Findings: indirect hernia     Electronically Signed by Justin Cruz MD on 11/30/2020 at 11:13 AM  [id number]

## 2020-11-30 NOTE — ANESTHESIA PREPROCEDURE EVALUATION
Relevant Problems   No relevant active problems       Anesthetic History   No history of anesthetic complications            Review of Systems / Medical History  Patient summary reviewed, nursing notes reviewed and pertinent labs reviewed    Pulmonary  Within defined limits                 Neuro/Psych   Within defined limits           Cardiovascular    Hypertension              Exercise tolerance: >4 METS     GI/Hepatic/Renal  Within defined limits              Endo/Other        Arthritis     Other Findings              Physical Exam    Airway  Mallampati: II  TM Distance: 4 - 6 cm  Neck ROM: normal range of motion   Mouth opening: Normal     Cardiovascular    Rhythm: regular  Rate: normal         Dental    Dentition: Upper partial plate     Pulmonary  Breath sounds clear to auscultation               Abdominal  Abdominal exam normal       Other Findings            Anesthetic Plan    ASA: 2  Anesthesia type: general          Induction: Intravenous  Anesthetic plan and risks discussed with: Patient

## 2020-11-30 NOTE — DISCHARGE INSTRUCTIONS
Patient Education     DISCHARGE SUMMARY from Nurse    PATIENT INSTRUCTIONS:    After general anesthesia or intravenous sedation, for 24 hours or while taking prescription Narcotics:  · Limit your activities  · Do not drive and operate hazardous machinery  · Do not make important personal or business decisions  · Do  not drink alcoholic beverages  · If you have not urinated within 8 hours after discharge, please contact your surgeon on call. Report the following to your surgeon:  · Excessive pain, swelling, redness or odor of or around the surgical area  · Temperature over 100.5  · Nausea and vomiting lasting longer than 4 hours or if unable to take medications  · Any signs of decreased circulation or nerve impairment to extremity: change in color, persistent  numbness, tingling, coldness or increase pain  · Any questions    What to do at Home:  Recommended activity: Activity as tolerated,     If you experience any of the following symptoms excess pain or bleeding, please follow up with Dr Tyree Cho. *  Please give a list of your current medications to your Primary Care Provider. *  Please update this list whenever your medications are discontinued, doses are      changed, or new medications (including over-the-counter products) are added. *  Please carry medication information at all times in case of emergency situations. These are general instructions for a healthy lifestyle:    No smoking/ No tobacco products/ Avoid exposure to second hand smoke  Surgeon General's Warning:  Quitting smoking now greatly reduces serious risk to your health.     Obesity, smoking, and sedentary lifestyle greatly increases your risk for illness    A healthy diet, regular physical exercise & weight monitoring are important for maintaining a healthy lifestyle    You may be retaining fluid if you have a history of heart failure or if you experience any of the following symptoms:  Weight gain of 3 pounds or more overnight or 5 pounds in a week, increased swelling in our hands or feet or shortness of breath while lying flat in bed. Please call your doctor as soon as you notice any of these symptoms; do not wait until your next office visit. The discharge information has been reviewed with the caregiver. The caregiver verbalized understanding. Discharge medications reviewed with the caregiver and appropriate educational materials and side effects teaching were provided. ___________________________________________________________________________________________________________________________________     Hernia Repair: What to Expect at [address] are likely to have pain for the next few days. You may also feel like you have the flu, and you may have a low fever and feel tired and sick to your stomach. This is common. You should feel better after a few days and will probably feel much better in 7 days. For several weeks you may feel twinges or pulling in the hernia repair when you move. You may have some bruising on the scrotum and along the penis. This is normal. To support your scrotum, you will need to wear a jockstrap or briefs, not boxers, for several days after a groin (inguinal) hernia repair. [employer]erra bicycle shorts may provide good support. This care sheet gives you a general idea about how long it will take for you to recover. But each person recovers at a different pace. Follow the steps below to get better as quickly as possible. How can you care for yourself at home? Activity    · Rest when you feel tired. Getting enough sleep will help you recover.     · Try to walk each day. Start by walking a little more than you did the day before. Bit by bit, increase the amount you walk.  Walking boosts blood flow and helps prevent pneumonia and constipation.     · Avoid strenuous activities, such as biking, jogging, weight lifting, or aerobic exercise, until your doctor says it is okay.     · Avoid lifting anything that would make you strain. This may include heavy grocery bags and milk containers, a heavy briefcase or backpack, cat litter or dog food bags, a vacuum , or a child.     · You may drive when you are no longer taking pain medicine and can quickly move your foot from the gas pedal to the brake. You must also be able to sit comfortably for a long period of time, even if you do not plan to go far. You might get caught in traffic.     · Most people are able to return to work within 1 to 2 weeks after surgery.     · You may shower 24 to 48 hours after surgery, if your doctor okays it. Pat the cut (incision) dry. Do not take a bath for the first 2 weeks, or until your doctor tells you it is okay.     · Your doctor will tell you when you can have sex again. Diet    · You can eat your normal diet. If your stomach is upset, try bland, low-fat foods like plain rice, broiled chicken, toast, and yogurt.     · Drink plenty of fluids (unless your doctor tells you not to).     · You may notice that your bowel movements are not regular right after your surgery. This is common. Avoid constipation and straining with bowel movements. You may want to take a fiber supplement every day. If you have not had a bowel movement after a couple of days, ask your doctor about taking a mild laxative. Medicines    · Your doctor will tell you if and when you can restart your medicines. He or she will also give you instructions about taking any new medicines.     · If you take aspirin or some other blood thinner, ask your doctor if and when to start taking it again. Make sure that you understand exactly what your doctor wants you to do.     · Be safe with medicines. Take pain medicines exactly as directed. ? If the doctor gave you a prescription medicine for pain, take it as prescribed.   ? If you are not taking a prescription pain medicine, take an over-the-counter medicine such as acetaminophen (Tylenol), ibuprofen (Advil, Motrin), or naproxen (Aleve). Read and follow all instructions on the label. ? Do not take two or more pain medicines at the same time unless the doctor told you to. Many pain medicines have acetaminophen, which is Tylenol. Too much acetaminophen (Tylenol) can be harmful.     · If your doctor prescribed antibiotics, take them as directed. Do not stop taking them just because you feel better. You need to take the full course of antibiotics.     · If you think your pain medicine is making you sick to your stomach:  ? Take your medicine after meals (unless your doctor has told you not to). ? Ask your doctor for a different pain medicine. Incision care    · If you have strips of tape on the cut (incision) the doctor made, leave the tape on for a week or until it falls off.     · If you have staples closing the cut, you will need to visit your doctor in 1 to 2 weeks to have them removed.     · Wash the area daily with warm, soapy water and pat it dry. Follow-up care is a key part of your treatment and safety. Be sure to make and go to all appointments, and call your doctor if you are having problems. It's also a good idea to know your test results and keep a list of the medicines you take. When should you call for help? Call 911 anytime you think you may need emergency care. For example, call if:    · You passed out (lost consciousness).     · You are short of breath. Call your doctor now or seek immediate medical care if:    · You have pain that does not get better after you take pain medicine.     · You are sick to your stomach and cannot keep fluids down.     · You have signs of a blood clot in your leg (called a deep vein thrombosis), such as:  ? Pain in your calf, back of the knee, thigh, or groin. ?  Redness and swelling in your leg or groin.     · You cannot pass stools or gas.     · Bright red blood has soaked through the bandage over your incision.     · You have loose stitches, or your incision comes open.     · You have signs of infection, such as:  ? Increased pain, swelling, warmth, or redness. ? Red streaks leading from the incision. ? Pus draining from the incision. ? A fever. Watch closely for any changes in your health, and be sure to contact your doctor if you have any problems. Where can you learn more? Go to http://www.gray.com/  Enter Z124 in the search box to learn more about \"Hernia Repair: What to Expect at Home. \"  Current as of: April 15, 2020               Content Version: 12.6  © 0422-1109 [employer]. Care instructions adapted under license by [employer] (which disclaims liability or warranty for this information). If you have questions about a medical condition or this instruction, always ask your healthcare professional. [place] [address] any warranty or liability for your use of this information. Patient Discharge Instructions    Gertrude Gusman / [id number] : 1960    Admitted 2020 Discharged: 2020     Take Home Medications            · It is important that you take the medication exactly as they are prescribed. · Keep your medication in the bottles provided by the pharmacist and keep a list of the medication names, dosages, and times to be taken in your wallet. · Do not take other medications without consulting your doctor. What to do at Home    Recommended diet: Regular Diet,     Recommended activity: Activity as tolerated, may shower whenever you wish          Follow-up Appointments   Procedures    FOLLOW UP VISIT Appointment in: Two Weeks     Standing Status:   Standing     Number of Occurrences:   1     Order Specific Question:   Appointment in     Answer: Two Weeks           Information obtained by :  I understand that if any problems occur once I am at home I am to contact my physician. I understand and acknowledge receipt of the instructions indicated above. Physician's or R.N.'s Signature                                                                  Date/Time                                                                                                                                              Patient or Representative Signature                                                          Date/Time

## 2020-11-30 NOTE — PERIOP NOTES
Discharge instructions given to daughter over phone with understanding  Daughter is Lobo Helms and will take pt home

## 2020-11-30 NOTE — INTERVAL H&P NOTE
Update History & Physical 
 
The Patient's History and Physical of November 23, 2020 was reviewed with the patient and I examined the patient. There was no change. The surgical site was confirmed by the patient and me. Plan:  The risk, benefits, expected outcome, and alternative to the recommended procedure have been discussed with the patient. Patient understands and wants to proceed with the procedure.  
 
Electronically signed by Otilia Samaniego MD on 11/30/2020 at 9:38 AM

## 2020-11-30 NOTE — ANESTHESIA POSTPROCEDURE EVALUATION
Post-Anesthesia Evaluation and Assessment    Patient: Maryann Petersen MRN: [id number]  SSN: [id number]    YOB: 1960  Age: 56 y.o. Sex: female      I have evaluated the patient and they are stable and ready for discharge from the PACU. Cardiovascular Function/Vital Signs  Visit Vitals  /62   Pulse 68   Temp 36.4 °C (97.5 °F)   Resp 14   Ht 5' 9\" (1.753 m)   Wt 75.3 kg (165 lb 15.8 oz)   SpO2 100%   BMI 24.51 kg/m²       Patient is status post General anesthesia for Procedure(s):  REPAIR RIGHT INGUINAL HERNIA. Nausea/Vomiting: None    Postoperative hydration reviewed and adequate. Pain:  Pain Scale 1: Numeric (0 - 10) (11/30/20 1107)  Pain Intensity 1: 0 (11/30/20 1107)   Managed    Neurological Status:   Neuro (WDL): Within Defined Limits (11/30/20 1107)  Neuro  LUE Motor Response: Purposeful (11/30/20 1107)  LLE Motor Response: Purposeful (11/30/20 1107)  RUE Motor Response: Purposeful (11/30/20 1107)  RLE Motor Response: Purposeful (11/30/20 1107)   At baseline    Mental Status, Level of Consciousness: Alert and  oriented to person, place, and time    Pulmonary Status:   O2 Device: CO2 nasal cannula (11/30/20 1111)   Adequate oxygenation and airway patent    Complications related to anesthesia: None    Post-anesthesia assessment completed.  No concerns    Signed By: Leti Wilkes MD     November 30, 2020

## 2020-12-16 ENCOUNTER — VIRTUAL VISIT (OUTPATIENT)
Dept: SURGERY | Age: 60
End: 2020-12-16
Payer: COMMERCIAL

## 2020-12-16 DIAGNOSIS — Z09 POSTOPERATIVE EXAMINATION: Primary | ICD-10-CM

## 2020-12-16 PROCEDURE — 99024 POSTOP FOLLOW-UP VISIT: CPT | Performed by: SURGERY

## 2020-12-16 NOTE — PROGRESS NOTES
1. Have you been to the ER, urgent care clinic since your last visit? Hospitalized since your last visit? No    2. Have you seen or consulted any other health care providers outside of the [address] [place] [address] since your last visit? Include any pap smears or colon screening.   No

## 2020-12-16 NOTE — PROGRESS NOTES
Subjective:      Manny Farias  is a 56 y.o. female presents virtually for f/u s/p RIGHT inguinal hernia repair 20. Pt denies any pain after surgery and has done well overall. She notes swelling has completely resolved. She notes she is back to normal activities and was able to go to the store the other day.      Past Medical History:   Diagnosis Date    Adult acne     Arthritis     History of colonic polyps      study normal, f/u 5 years Lia Neighbor)    Hypertension     HISTORY NOT TAKING ANY MEDS CURRENTLY( 20)    Intestinal obstruction without gangrene due to right inguinal hernia 2020    Lipoma     right upper extremity    Post-menopausal     Right inguinal hernia 3/16/2016    Wears dentures        Past Surgical History:   Procedure Laterality Date    BREAST SURGERY PROCEDURE UNLISTED Left 80'S    BREAST BX    HX COLONOSCOPY      due  (polyp)    HX HERNIA REPAIR  2020    Repair of right inguinal hernia with plug and patch-[facility]Dr. Yasmin Cancino    HX HYSTERECTOMY      no cervix ovaries left in - done for menorrhagia    HX TONSILLECTOMY         Social History     Tobacco Use    Smoking status: Former Smoker     Packs/day: 0.00     Years: 4.00     Pack years: 0.00     Types: Cigarettes     Quit date: 1991     Years since quittin.9    Smokeless tobacco: Never Used    Tobacco comment: SMOKED 1 CIGARRETTE DAILY FOR 4 YEARS   Substance Use Topics    Alcohol use: No     Alcohol/week: 0.0 standard drinks       Family History   Problem Relation Age of Onset    Alcohol abuse Father     Liver Disease Father         cirrhosis    Stroke Maternal Grandmother     Prostate Cancer Maternal Grandfather     Heart Disease Mother         Has Debrillator    Hypertension Mother     Pacemaker Mother     Arrhythmia Mother     No Known Problems Sister     No Known Problems Brother     No Known Problems Sister     Anesth Problems Neg Hx        Current Outpatient Medications on File Prior to Visit   Medication Sig Dispense Refill    MULTIVITAMIN PO Take  by mouth daily.  ondansetron (Zofran ODT) 4 mg disintegrating tablet 1 Tab by SubLINGual route every eight (8) hours as needed for Nausea or Vomiting. 20 Tab 0    ibuprofen (MOTRIN) 600 mg tablet Take 1 Tab by mouth every six (6) hours as needed for Pain. 20 Tab 0     No current facility-administered medications on file prior to visit. Allergies   Allergen Reactions    Pcn [Penicillins] Hives     itching         Review of Systems:    A comprehensive review of systems was negative except for that written in the History of Present Illness. Objective: There were no vitals taken for this visit. Labs: No results found for this or any previous visit (from the past 24 hour(s)). Assessment and Plan:       ICD-10-CM ICD-9-CM    1. Postoperative examination  Z09 V67.00        F/U PRN. All questions were answered and pt is in agreement with this plan. Total face to face time with patient: 5 minutes. Greater than 50% of the time was spent in counseling. This document was scribed by Joana Martínez as dictated by Dr. Deedee Townsend.      Signed By: Paresh Haas MD     12/16/20

## 2021-03-23 ENCOUNTER — OFFICE VISIT (OUTPATIENT)
Dept: INTERNAL MEDICINE CLINIC | Age: 61
End: 2021-03-23
Payer: COMMERCIAL

## 2021-03-23 VITALS
BODY MASS INDEX: 25.77 KG/M2 | RESPIRATION RATE: 16 BRPM | HEART RATE: 68 BPM | HEIGHT: 69 IN | DIASTOLIC BLOOD PRESSURE: 85 MMHG | WEIGHT: 174 LBS | SYSTOLIC BLOOD PRESSURE: 152 MMHG | TEMPERATURE: 98.4 F | OXYGEN SATURATION: 99 %

## 2021-03-23 DIAGNOSIS — J30.1 SEASONAL ALLERGIC RHINITIS DUE TO POLLEN: Primary | ICD-10-CM

## 2021-03-23 DIAGNOSIS — M17.12 OSTEOARTHRITIS OF LEFT KNEE, UNSPECIFIED OSTEOARTHRITIS TYPE: ICD-10-CM

## 2021-03-23 PROCEDURE — 99214 OFFICE O/P EST MOD 30 MIN: CPT | Performed by: FAMILY MEDICINE

## 2021-03-23 PROCEDURE — 20611 DRAIN/INJ JOINT/BURSA W/US: CPT | Performed by: FAMILY MEDICINE

## 2021-03-23 RX ORDER — TRIAMCINOLONE ACETONIDE 40 MG/ML
40 INJECTION, SUSPENSION INTRA-ARTICULAR; INTRAMUSCULAR ONCE
Qty: 1 ML | Refills: 0
Start: 2021-03-23 | End: 2021-03-23

## 2021-03-23 NOTE — PATIENT INSTRUCTIONS
Take Zyrtec at night along with nasal saline spray. May also add in Flonase nasal spray if these do not work If you have had an injection today, continue to rest the area for a few more days before resuming regular activities. It may be more painful for the first 1-2 days. NSAIDS are to be avoided. Watch for fever, or increased swelling or persistent pain in the joint. Call or return to clinic prn if such symptoms occur or there is failure to improve as anticipated. 1) Remember to stay active and/or exercise regularly (I suggest 30-45 minutes daily) 2) For reliable dietary information, go to www. EATRIGHT.org. You may wish to consider seeing the nutritionists: 
Annabella Spangler @ [facility] or [address] [facility] - [phone] / [phone] Or: 
Sushil Nieves @ [facility] or [facility] - [phone],  
 - also consider the Mediterranean diet and DASH diets 3) I routinely suggest a complete physical exam once each year (your birth month)

## 2021-03-23 NOTE — PROGRESS NOTES
Chief Complaint   Patient presents with   [facility]     Patient is here for a throat issure.  Knee Pain     Patient complains of a feeling of waking up with need to swallow mucus that is stuck in her throat. Patient denies any congestion in the daytime. She does have a seasonal history of allergies but is not taking any current medications. She is worried about possible GERD due to pharmacist telling her that she may have reflux. She denies any acid feeling in the mouth or burning in the chest.  She also denies any cough currently. Patient states it feels like a somewhat choking sensation in the middle the night but when she swallows everything is cleared. She is not tried any over-the-counter therapies for this. Patient also complains of left knee pain, states that she had a knee injection done over a year ago which helped her immensely. States that her pain is come back and is mostly located on the inside the knee with no radiation. She denies any trauma history. Reviewed and agree with Nurse Note and duplicated in this note. Reviewed PmHx, RxHx, FmHx, SocHx, AllgHx and updated and dated in the chart.     Family History   Problem Relation Age of Onset    Alcohol abuse Father     Liver Disease Father         cirrhosis    Stroke Maternal Grandmother     Prostate Cancer Maternal Grandfather     Heart Disease Mother         Has Debrillator    Hypertension Mother     Pacemaker Mother     Arrhythmia Mother     No Known Problems Sister     No Known Problems Brother     No Known Problems Sister     Anesth Problems Neg Hx        Past Medical History:   Diagnosis Date    Adult acne     Arthritis     History of colonic polyps     2014 study normal, f/u 5 years Kamlesh Geller)    Hypertension     HISTORY NOT TAKING ANY MEDS CURRENTLY( 11-25-20)    Intestinal obstruction without gangrene due to right inguinal hernia 11/23/2020    Lipoma     right upper extremity    Post-menopausal     Right inguinal hernia 3/16/2016    Wears dentures       Social History     Socioeconomic History    Marital status: SINGLE     Spouse name: Not on file    Number of children: Not on file    Years of education: Not on file    Highest education level: Not on file   Occupational History    Occupation: [employer] [address] Use    Smoking status: Former Smoker     Packs/day: 0.00     Years: 4.00     Pack years: 0.00     Types: Cigarettes     Quit date: 1991     Years since quittin.2    Smokeless tobacco: Never Used    Tobacco comment: SMOKED 1 CIGARRETTE DAILY FOR 4 YEARS   Substance and Sexual Activity    Alcohol use: No     Alcohol/week: 0.0 standard drinks    Drug use: No    Sexual activity: Never   Other Topics Concern     Service No    Blood Transfusions No    Caffeine Concern Yes     Comment: 0-1 cup daily (previously 1-2 per day)    Occupational Exposure No    Sleep Concern Yes     Comment: restless sleep    Stress Concern No    Weight Concern No    Special Diet No    Exercise Yes     Comment: walks routinely   Social History Narrative    Single, has two children (son and daughter). [employer].           Review of Systems - negative except as listed above      Objective:     Vitals:    21 1603   Resp: 16   Weight: 174 lb (78.9 kg)   Height: 5' 9\" (1.753 m)       Physical Examination: General appearance - alert, well appearing, and in no distress  Eyes - pupils equal and reactive, extraocular eye movements intact  Ears - bilateral TM's and external ear canals normal  Nose - normal and patent, no erythema, discharge or polyps  Mouth - mucous membranes moist, pharynx normal without lesions  Neck - supple, no significant adenopathy  Chest - clear to auscultation, no wheezes, rales or rhonchi, symmetric air entry  Heart - normal rate, regular rhythm, normal S1, S2, no murmurs, rubs, clicks or gallops  Abdomen - soft, nontender, nondistended, no masses or organomegaly  Back exam - full range of motion, no tenderness, palpable spasm or pain on motion  Neurological - alert, oriented, normal speech, no focal findings or movement disorder noted  Musculoskeletal - left knee exam:  The patient'sleft Knee  is  normal to inspection. The ROM is normal and there is flexion to Normal Effusion is: mild The joint exhibits Negative warmth and Crepitus is: mild. The Stanley test is:  negative Joint Line Tenderness is  negative medial.  The Thessaly Test is not tested  and the Lachman is  negative. The Anterior Drawer is negative. The Posterior Drawer is:  negative. Valgus Stress (for MCL) is:  normal . Varus Stress (for LCL) is  normal  . The Jacqueline Test is negative and the Apprehension Sign:  negative  Patellar Grind positive   Extremities - peripheral pulses normal, no pedal edema, no clubbing or cyanosis  Skin - normal coloration and turgor, no rashes, no suspicious skin lesions noted   Time Out taken at:  4:30 PM  3/23/2021    * Patient was identified by name and date of birth   * Agreement on procedure being performed was verified  * Risks and Benefits explained to the patient  * Procedure site verified and marked as necessary  * Patient was positioned for comfort  * Consent was signed and verified   In the presence of: Witness: Hilda  Injection #: 1  Needle:  22 gauge  Procedure: This procedure was discussed with Janee Finley and other therapeutic options were considered (risks vs benefits). Janee Finley and I thought that an injection was merited. After informed consent was obtained, landmarks were identified(marked), and the left joint  was cleansed with ChlorPrep in the standard sterile manner. 3mL  1% lidocaine  and  1mL Kenalog  was then injected and needle tenotomy was not performed. Procedure performed with ultrasound needle guidance. The needle was then withdrawn. T he procedure was well tolerated.   The patient is asked to continue to rest the area for a few more days before resuming regular activities. It may be more painful for the first 1-2 days. NSAIDS are to be avoided. Watch for fever, or increased swelling or persistent pain in the joint. Call or return to clinic prn if such symptoms occur or there is failure to improve as anticipated. The procedure did provide relief of symptoms in the clinic. RTC in 4 weeks for reevaluation and possible reinjection. Given the patient's body habitus and the anatomically deep nature of this structure, sonographic guidance is recommended to prevent injury to neurovascular structures and confirm accuracy of injection. Furthermore, this patient has failed conservative treatment with physical therapy and modalities and the diagnostic and therapeutic accuracy is important. Assessment/ Plan:   Diagnoses and all orders for this visit:    1. Seasonal allergic rhinitis due to pollen    2. Osteoarthritis of left knee, unspecified osteoarthritis type  -     VA ARTHROCENTESIS ASPIR&/INJ MAJOR JT/BURSA W/US  -     TRIAMCINOLONE ACETONIDE INJ    Other orders  -     triamcinolone acetonide (Kenalog) 40 mg/mL injection; 1 mL by Intra artICUlar route once for 1 dose. Patient will try Flonase nasal spray, nasal saline and Zyrtec for her seasonal allergies. Return to clinic in 2 weeks    I have discussed the diagnosis with the patient and the intended plan as seen in the above orders. The patient has received an after-visit summary and questions were answered concerning future plans. Medication Side Effects and Warnings were discussed with patient,  Patient Labs were reviewed and or requested, and  Patient Past Records were reviewed and or requested  yes       Pt agrees to call or return to clinic and/or go to closest ER with any worsening of symptoms.   This may include, but not limited to increased fever (>100.4) with NSAIDS or Tylenol, increased edema, confusion, rash, worsening of presenting symptoms. Please note that this dictation was completed with Dox[employer], the computer voice recognition software. Quite often unanticipated grammatical, syntax, homophones, and other interpretive errors are inadvertently transcribed by the computer software. Please disregard these errors. Please excuse any errors that have escaped final proofreading. Thank you.

## 2021-04-26 ENCOUNTER — OFFICE VISIT (OUTPATIENT)
Dept: INTERNAL MEDICINE CLINIC | Age: 61
End: 2021-04-26
Payer: COMMERCIAL

## 2021-04-26 VITALS
RESPIRATION RATE: 16 BRPM | BODY MASS INDEX: 25.18 KG/M2 | DIASTOLIC BLOOD PRESSURE: 82 MMHG | WEIGHT: 170 LBS | SYSTOLIC BLOOD PRESSURE: 153 MMHG | HEIGHT: 69 IN | OXYGEN SATURATION: 97 % | TEMPERATURE: 98.6 F | HEART RATE: 65 BPM

## 2021-04-26 DIAGNOSIS — J30.1 SEASONAL ALLERGIC RHINITIS DUE TO POLLEN: Primary | ICD-10-CM

## 2021-04-26 PROCEDURE — 99213 OFFICE O/P EST LOW 20 MIN: CPT | Performed by: FAMILY MEDICINE

## 2021-04-26 NOTE — PROGRESS NOTES
Chief Complaint   Patient presents with    Heartburn     Patient is here for reflux     she is a 56y.o. year old female who presents for follow-up of congestion and reflux type symptoms. Patient was put on Flonase and Zyrtec due to allergic rhinitis and states that her symptoms have since resolved. She has been doing well with no issues and is comfortable with these medications. She still denies any problems with acid production, burning in the throat or chest.  Patient has no other complaints today    Reviewed and agree with Nurse Note and duplicated in this note. Reviewed PmHx, RxHx, FmHx, SocHx, AllgHx and updated and dated in the chart.     Family History   Problem Relation Age of Onset    Alcohol abuse Father     Liver Disease Father         cirrhosis    Stroke Maternal Grandmother     Prostate Cancer Maternal Grandfather     Heart Disease Mother         Has Debrillator    Hypertension Mother     Pacemaker Mother     Arrhythmia Mother     No Known Problems Sister     No Known Problems Brother     No Known Problems Sister     Anesth Problems Neg Hx        Past Medical History:   Diagnosis Date    Adult acne     Arthritis     History of colonic polyps     2014 study normal, f/u 5 years Stephanie Javed)    Hypertension     HISTORY NOT TAKING ANY MEDS CURRENTLY( 11-25-20)    Intestinal obstruction without gangrene due to right inguinal hernia 11/23/2020    Lipoma     right upper extremity    Post-menopausal     Right inguinal hernia 3/16/2016    Wears dentures       Social History     Socioeconomic History    Marital status: SINGLE     Spouse name: Not on file    Number of children: Not on file    Years of education: Not on file    Highest education level: Not on file   Occupational History    Occupation: [employer]    Tobacco Use    Smoking status: Former Smoker     Packs/day: 0.00     Years: 4.00     Pack years: 0.00     Types: Cigarettes     Quit date: 1/1/1991     Years since quittin.3    Smokeless tobacco: Never Used    Tobacco comment: SMOKED 1 CIGARRETTE DAILY FOR 4 YEARS   Substance and Sexual Activity    Alcohol use: No     Alcohol/week: 0.0 standard drinks    Drug use: No    Sexual activity: Never   Other Topics Concern     Service No    Blood Transfusions No    Caffeine Concern Yes     Comment: 0-1 cup daily (previously 1-2 per day)    Occupational Exposure No    Sleep Concern Yes     Comment: restless sleep    Stress Concern No    Weight Concern No    Special Diet No    Exercise Yes     Comment: walks routinely   Social History Narrative    Single, has two children (son and daughter). [employer]. Review of Systems - negative except as listed above      Objective:     Vitals:    21 1601   Resp: 16   Weight: 170 lb (77.1 kg)   Height: 5' 9\" (1.753 m)       Physical Examination:    General appearance - alert, well appearing, and in no distress  Eyes - pupils equal and reactive, extraocular eye movements intact  Ears - bilateral TM's and external ear canals normal  Nose - normal and patent, no erythema, discharge or polyps  Mouth - mucous membranes moist, pharynx normal without lesions  Neck - supple, no significant adenopathy  Chest - clear to auscultation, no wheezes, rales or rhonchi, symmetric air entry  Heart - normal rate, regular rhythm, normal S1, S2, no murmurs, rubs, clicks or gallops  Abdomen - soft, nontender, nondistended, no masses or organomegaly  Neurological - alert, oriented, normal speech, no focal findings or movement disorder noted  Musculoskeletal - no joint tenderness, deformity or swelling  Extremities - peripheral pulses normal, no pedal edema, no clubbing or cyanosis  Skin - normal coloration and turgor, no rashes, no suspicious skin lesions noted  Assessment/ Plan:   Diagnoses and all orders for this visit:    1.  Seasonal allergic rhinitis due to pollen    Continue Flonase and Zyrtec as needed  Patient will return to clinic for yearly physicals           I have discussed the diagnosis with the patient and the intended plan as seen in the above orders. The patient has received an after-visit summary and questions were answered concerning future plans. Medication Side Effects and Warnings were discussed with patient,  Patient Labs were reviewed and or requested, and  Patient Past Records were reviewed and or requested  yes       Pt agrees to call or return to clinic and/or go to closest ER with any worsening of symptoms. This may include, but not limited to increased fever (>100.4) with NSAIDS or Tylenol, increased edema, confusion, rash, worsening of presenting symptoms. Please note that this dictation was completed with [employer], the computer voice recognition software. Quite often unanticipated grammatical, syntax, homophones, and other interpretive errors are inadvertently transcribed by the computer software. Please disregard these errors. Please excuse any errors that have escaped final proofreading. Thank you.

## 2021-06-21 ENCOUNTER — OFFICE VISIT (OUTPATIENT)
Dept: INTERNAL MEDICINE CLINIC | Age: 61
End: 2021-06-21
Payer: COMMERCIAL

## 2021-06-21 VITALS
WEIGHT: 172.9 LBS | HEART RATE: 60 BPM | BODY MASS INDEX: 25.61 KG/M2 | SYSTOLIC BLOOD PRESSURE: 137 MMHG | HEIGHT: 69 IN | OXYGEN SATURATION: 97 % | DIASTOLIC BLOOD PRESSURE: 86 MMHG

## 2021-06-21 DIAGNOSIS — M79.644 FINGER PAIN, RIGHT: Primary | ICD-10-CM

## 2021-06-21 DIAGNOSIS — H92.03 EAR PAIN, BILATERAL: ICD-10-CM

## 2021-06-21 DIAGNOSIS — H61.23 BILATERAL IMPACTED CERUMEN: ICD-10-CM

## 2021-06-21 PROCEDURE — 73130 X-RAY EXAM OF HAND: CPT | Performed by: FAMILY MEDICINE

## 2021-06-21 PROCEDURE — 69209 REMOVE IMPACTED EAR WAX UNI: CPT | Performed by: FAMILY MEDICINE

## 2021-06-21 PROCEDURE — 99214 OFFICE O/P EST MOD 30 MIN: CPT | Performed by: FAMILY MEDICINE

## 2021-06-21 NOTE — PROGRESS NOTES
Chief Complaint   Patient presents with    Ear Pain     bilateral ear popping x 1 week, pt states she used some ear wax removal and started to have ear popping.  Finger Swelling     right 3rd digit swelling x 2 weeks, no specific injury. she is a 56y.o. year old female who presents for evaluation of bilateral ear popping. Bilateral ear popping x 1 week, pt states she used some ear wax removal and started to have ear popping. she is a 56y.o. year old female who presents for evaluation of right 3rd digit swelling  Pain Assessment Encounter      Miguel Angel Kaufman  6/21/2021  Onset of Symptoms: 2 weeks  ________________________________________________________________________  Description:no specific injury per patient. Little pain. Frequency: 5 times a day  Pain Scale:(1-10): 0  Trauma Hx: none  Hx of similar symptoms: No  Radiation: NO  Duration:  intermittent      Progression: is unchanged  What makes it better?: none  What makes it worse?: bending   Medications tried: none  Reviewed and agree with Nurse Note and duplicated in this note. Reviewed PmHx, RxHx, FmHx, SocHx, AllgHx and updated and dated in the chart.     Family History   Problem Relation Age of Onset    Alcohol abuse Father     Liver Disease Father         cirrhosis    Stroke Maternal Grandmother     Prostate Cancer Maternal Grandfather     Heart Disease Mother         Has Debrillator    Hypertension Mother     Pacemaker Mother     Arrhythmia Mother     No Known Problems Sister     No Known Problems Brother     No Known Problems Sister     Anesth Problems Neg Hx        Past Medical History:   Diagnosis Date    Adult acne     Arthritis     History of colonic polyps     2014 study normal, f/u 5 years Edwina Haider)    Hypertension     HISTORY NOT TAKING ANY MEDS CURRENTLY( 11-25-20)    Intestinal obstruction without gangrene due to right inguinal hernia 11/23/2020    Lipoma     right upper extremity    Post-menopausal     Right inguinal hernia 3/16/2016    Wears dentures       Social History     Socioeconomic History    Marital status: SINGLE     Spouse name: Not on file    Number of children: Not on file    Years of education: Not on file    Highest education level: Not on file   Occupational History    Occupation: [employer] [address] Use    Smoking status: Former Smoker     Packs/day: 0.00     Years: 4.00     Pack years: 0.00     Types: Cigarettes     Quit date: 1991     Years since quittin.4    Smokeless tobacco: Never Used    Tobacco comment: SMOKED 1 CIGARRETTE DAILY FOR 4 YEARS   Substance and Sexual Activity    Alcohol use: No     Alcohol/week: 0.0 standard drinks    Drug use: No    Sexual activity: Never   Other Topics Concern     Service No    Blood Transfusions No    Caffeine Concern Yes     Comment: 0-1 cup daily (previously 1-2 per day)    Occupational Exposure No    Sleep Concern Yes     Comment: restless sleep    Stress Concern No    Weight Concern No    Special Diet No    Exercise Yes     Comment: walks routinely   Social History Narrative    Single, has two children (son and daughter). [facility]. Social Determinants of Health     Financial Resource Strain:     Difficulty of Paying Living Expenses:    Food Insecurity:     Worried About Running Out of Food in the Last Year:     [address] in the Last Year:    Transportation Needs:     Lack of Transportation (Medical):      Lack of Transportation (Non-Medical):    Physical Activity:     Days of Exercise per Week:     Minutes of Exercise per Session:    Stress:     Feeling of Stress :    Social Connections:     Frequency of Communication with Friends and Family:     Frequency of Social Gatherings with Friends and Family:     Attends [employer] witness Services:     Active Member of Clubs or Organizations:     Attends Club or Organization Meetings:     Marital Status:         Review of Systems - negative except as listed above      Objective:     Vitals:    06/21/21 1038   BP: 137/86   Pulse: 60   SpO2: 97%   Weight: 172 lb 14.4 oz (78.4 kg)   Height: 5' 9\" (1.753 m)       Physical Examination: General appearance - alert, well appearing, and in no distress  Eyes - pupils equal and reactive, extraocular eye movements intact  Ears - right ear normal, left ear normal, ceruminosis noted, cerumen removed  Nose - normal and patent, no erythema, discharge or polyps  Mouth - mucous membranes moist, pharynx normal without lesions  Neck - supple, no significant adenopathy  Chest - clear to auscultation, no wheezes, rales or rhonchi, symmetric air entry  Heart - normal rate, regular rhythm, normal S1, S2, no murmurs, rubs, clicks or gallops  Abdomen - soft, nontender, nondistended, no masses or organomegaly  Musculoskeletal - no joint tenderness, deformity or swelling  Extremities -swelling of right third PIP with swan-neck deformity noted  Skin - normal coloration and turgor, no rashes, no suspicious skin lesions noted     Assessment/ Plan:   Diagnoses and all orders for this visit:    1. Finger pain, right  -     RHEUMASSURE; Future    2. Ear pain, bilateral  -     XR HAND RT MIN 3 V; Future  -     REMOVE IMPACTED EAR WAX    3. Bilateral impacted cerumen                I have discussed the diagnosis with the patient and the intended plan as seen in the above orders. The patient has received an after-visit summary and questions were answered concerning future plans. Medication Side Effects and Warnings were discussed with patient,  Patient Labs were reviewed and or requested, and  Patient Past Records were reviewed and or requested  yes       Pt agrees to call or return to clinic and/or go to closest ER with any worsening of symptoms. This may include, but not limited to increased fever (>100.4) with NSAIDS or Tylenol, increased edema, confusion, rash, worsening of presenting symptoms.     Please note that this dictation was completed with Dragon, the computer voice recognition software. Quite often unanticipated grammatical, syntax, homophones, and other interpretive errors are inadvertently transcribed by the computer software. Please disregard these errors. Please excuse any errors that have escaped final proofreading. Thank you.

## 2021-06-28 LAB
14.3.3 ETA, RHEUM. ARTHRITIS: <0.2 NG/ML
CCP IGA+IGG SERPL IA-ACNC: <20 UNITS
RHEUMATOID FACT SERPL-ACNC: <14 UNITS/ML

## 2021-08-05 ENCOUNTER — OFFICE VISIT (OUTPATIENT)
Dept: INTERNAL MEDICINE CLINIC | Age: 61
End: 2021-08-05
Payer: COMMERCIAL

## 2021-08-05 VITALS
OXYGEN SATURATION: 96 % | DIASTOLIC BLOOD PRESSURE: 90 MMHG | HEIGHT: 69 IN | SYSTOLIC BLOOD PRESSURE: 141 MMHG | WEIGHT: 172 LBS | HEART RATE: 75 BPM | RESPIRATION RATE: 16 BRPM | BODY MASS INDEX: 25.48 KG/M2

## 2021-08-05 DIAGNOSIS — Z01.419 WELL WOMAN EXAM: Primary | ICD-10-CM

## 2021-08-05 PROCEDURE — 99396 PREV VISIT EST AGE 40-64: CPT | Performed by: FAMILY MEDICINE

## 2021-08-05 NOTE — PROGRESS NOTES
Chief Complaint   Patient presents with    Complete Physical     Patient is here for a wellness visit. she is a 59y.o. year old female who presents for CPE  Complete Physical Exam Questions:    LMP -  N/a  Last Pap (q 3 years, or q5 with HPV) - unknown  Last Mammogram (50-74 biennially)- 2020  Hx of abnl Pap - Unknown    1. Do you follow a low fat diet?  no  2. Are you up to date on your Tdap (<10 years)? TD 2016  3. Have you ever had a Pneumovax vaccine (>65)? No   PCV13 No   PPSV23 No  4. Have you had Zoster vaccine (>60)? No  5. Have you had the HPV - Gardasil (13- 26)? Not applicable  6. Do you follow an exercise program?  no  7. Do you smoke?  no  8. Do you consider yourself overweight?  no  9. Is there a family history of CAD< age 48? No  10. Is there a family history of Cancer?  yes  11. Do you know your Cancer risks? No  12. Have you had a colonoscopy? yes  13. Have you been tested for HIV or other STI's? No HIV testing today(18-66 y/o)? No  14. Have had a bone density scan or DEXA done(>65)? No  15. Have you had an EKG performed in the last five years (>50)? No    Other complaints:    Reviewed and agree with Nurse Note and duplicated in this note. Reviewed PmHx, RxHx, FmHx, SocHx, AllgHx and updated and dated in the chart.     Family History   Problem Relation Age of Onset    Alcohol abuse Father     Liver Disease Father         cirrhosis    Stroke Maternal Grandmother     Prostate Cancer Maternal Grandfather     Heart Disease Mother         Has Debrillator    Hypertension Mother     Pacemaker Mother     Arrhythmia Mother     No Known Problems Sister     No Known Problems Brother     No Known Problems Sister     Anesth Problems Neg Hx        Past Medical History:   Diagnosis Date    Adult acne     Arthritis     History of colonic polyps     2014 study normal, f/u 5 years Jose Fowler)    Hypertension     HISTORY NOT TAKING ANY MEDS CURRENTLY( 11-25-20)    Intestinal obstruction without gangrene due to right inguinal hernia 2020    Lipoma     right upper extremity    Post-menopausal     Right inguinal hernia 3/16/2016    Wears dentures       Social History     Socioeconomic History    Marital status: SINGLE     Spouse name: Not on file    Number of children: Not on file    Years of education: Not on file    Highest education level: Not on file   Occupational History    Occupation: [address] [place] [address]   Tobacco Use    Smoking status: Former Smoker     Packs/day: 0.00     Years: 4.00     Pack years: 0.00     Types: Cigarettes     Quit date: 1991     Years since quittin.6    Smokeless tobacco: Never Used    Tobacco comment: SMOKED 1 CIGARRETTE DAILY FOR 4 YEARS   Substance and Sexual Activity    Alcohol use: No     Alcohol/week: 0.0 standard drinks    Drug use: No    Sexual activity: Never   Other Topics Concern     Service No    Blood Transfusions No    Caffeine Concern Yes     Comment: 0-1 cup daily (previously 1-2 per day)    Occupational Exposure No    Sleep Concern Yes     Comment: restless sleep    Stress Concern No    Weight Concern No    Special Diet No    Exercise Yes     Comment: walks routinely   Social History Narrative    Single, has two children (son and daughter). [address] [place] [address]. Social Determinants of Health     Financial Resource Strain:     Difficulty of Paying Living Expenses:    Food Insecurity:     Worried About Running Out of Food in the Last Year:     [address] in the Last Year:    Transportation Needs:     Lack of Transportation (Medical):      Lack of Transportation (Non-Medical):    Physical Activity:     Days of Exercise per Week:     Minutes of Exercise per Session:    Stress:     Feeling of Stress :    Social Connections:     Frequency of Communication with Friends and Family:     Frequency of Social Gatherings with Friends and Family:     Attends Jainism Services:     Active Member of Clubs or Organizations:     Attends Club or Organization Meetings:     Marital Status:         Review of Systems - negative except as listed above      Objective:     Vitals:    08/05/21 1149   BP: (!) 141/90   Pulse: 75   Resp: 16   SpO2: 96%   Weight: 172 lb (78 kg)   Height: 5' 9\" (1.753 m)       Physical Examination: General appearance - alert, well appearing, and in no distress  Eyes - pupils equal and reactive, extraocular eye movements intact  Ears - bilateral TM's and external ear canals normal  Nose - normal and patent, no erythema, discharge or polyps  Mouth - mucous membranes moist, pharynx normal without lesions  Neck - supple, no significant adenopathy  Chest - clear to auscultation, no wheezes, rales or rhonchi, symmetric air entry  Heart - normal rate, regular rhythm, normal S1, S2, no murmurs, rubs, clicks or gallops  Abdomen - soft, nontender, nondistended, no masses or organomegaly  Neurological - alert, oriented, normal speech, no focal findings or movement disorder noted  Musculoskeletal - no joint tenderness, deformity or swelling  Extremities - peripheral pulses normal, no pedal edema, no clubbing or cyanosis  Skin - normal coloration and turgor, no rashes, no suspicious skin lesions noted      Assessment/ Plan:   Diagnoses and all orders for this visit:    1. Well woman exam  -     LIPID PANEL; Future  -     METABOLIC PANEL, COMPREHENSIVE; Future  -     CBC W/O DIFF; Future           Labs to be drawn: CBC, CMP, Lipid            I have discussed the diagnosis with the patient and the intended plan as seen in the above orders. The patient has received an after-visit summary and questions were answered concerning future plans.    Medication Side Effects and Warnings were discussed with patient,  Patient Labs were reviewed and or requested, and  Patient Past Records were reviewed and or requested  yes         Pt agrees to call or return to clinic and/or go to closest ER with any worsening of symptoms. This may include, but not limited to increased fever (>100.4) with NSAIDS or Tylenol, increased edema, confusion, rash, worsening of presenting symptoms. Please note that this dictation was completed with Myrl, the computer voice recognition software. Quite often unanticipated grammatical, syntax, homophones, and other interpretive errors are inadvertently transcribed by the computer software. Please disregard these errors. Please excuse any errors that have escaped final proofreading. Thank you.

## 2021-08-06 LAB
ALBUMIN SERPL-MCNC: 4.3 G/DL (ref 3.8–4.8)
ALBUMIN/GLOB SERPL: 1.7 {RATIO} (ref 1.2–2.2)
ALP SERPL-CCNC: 100 IU/L (ref 48–121)
ALT SERPL-CCNC: 13 IU/L (ref 0–32)
AST SERPL-CCNC: 19 IU/L (ref 0–40)
BILIRUB SERPL-MCNC: 0.3 MG/DL (ref 0–1.2)
BUN SERPL-MCNC: 12 MG/DL (ref 8–27)
BUN/CREAT SERPL: 14 (ref 12–28)
CALCIUM SERPL-MCNC: 8.7 MG/DL (ref 8.7–10.3)
CHLORIDE SERPL-SCNC: 104 MMOL/L (ref 96–106)
CHOLEST SERPL-MCNC: 182 MG/DL (ref 100–199)
CO2 SERPL-SCNC: 27 MMOL/L (ref 20–29)
CREAT SERPL-MCNC: 0.87 MG/DL (ref 0.57–1)
ERYTHROCYTE [DISTWIDTH] IN BLOOD BY AUTOMATED COUNT: 13 % (ref 11.7–15.4)
GLOBULIN SER CALC-MCNC: 2.6 G/DL (ref 1.5–4.5)
GLUCOSE SERPL-MCNC: 91 MG/DL (ref 65–99)
HCT VFR BLD AUTO: 36.9 % (ref 34–46.6)
HDLC SERPL-MCNC: 90 MG/DL
HGB BLD-MCNC: 11.9 G/DL (ref 11.1–15.9)
LDLC SERPL CALC-MCNC: 81 MG/DL (ref 0–99)
MCH RBC QN AUTO: 29.5 PG (ref 26.6–33)
MCHC RBC AUTO-ENTMCNC: 32.2 G/DL (ref 31.5–35.7)
MCV RBC AUTO: 91 FL (ref 79–97)
PLATELET # BLD AUTO: 190 X10E3/UL (ref 150–450)
POTASSIUM SERPL-SCNC: 3.6 MMOL/L (ref 3.5–5.2)
PROT SERPL-MCNC: 6.9 G/DL (ref 6–8.5)
RBC # BLD AUTO: 4.04 X10E6/UL (ref 3.77–5.28)
SODIUM SERPL-SCNC: 143 MMOL/L (ref 134–144)
TRIGL SERPL-MCNC: 55 MG/DL (ref 0–149)
VLDLC SERPL CALC-MCNC: 11 MG/DL (ref 5–40)
WBC # BLD AUTO: 3 X10E3/UL (ref 3.4–10.8)

## 2021-08-16 ENCOUNTER — TELEPHONE (OUTPATIENT)
Dept: INTERNAL MEDICINE CLINIC | Age: 61
End: 2021-08-16

## 2021-08-16 NOTE — TELEPHONE ENCOUNTER
Contacted pt. Made her aware that her lab results were normal. She stated understanding and requested that she have a copy mailed to her. Printed out a copy and put in mail bin to be mailed.

## 2021-08-16 NOTE — TELEPHONE ENCOUNTER
----- Message from Carline Rashid sent at 8/14/2021  9:18 AM EDT -----  Regarding: Dr. Anselmo Candelaria first and last name: pt      Reason for call: Request      Callback required yes/no and why: yes, please      Best contact number(s): [phone]      Details to clarify the request: Patient needs a call with lab results and we updated the patients  new address and she needs it mailed to the new address.       Carline Rashid

## 2022-03-19 PROBLEM — I10 BENIGN ESSENTIAL HTN: Status: ACTIVE | Noted: 2018-04-19

## 2022-03-20 PROBLEM — K40.30: Status: ACTIVE | Noted: 2020-11-23

## 2022-06-20 ENCOUNTER — OFFICE VISIT (OUTPATIENT)
Dept: INTERNAL MEDICINE CLINIC | Age: 62
End: 2022-06-20
Payer: COMMERCIAL

## 2022-06-20 VITALS
DIASTOLIC BLOOD PRESSURE: 82 MMHG | TEMPERATURE: 98 F | HEART RATE: 56 BPM | HEIGHT: 69 IN | BODY MASS INDEX: 23.64 KG/M2 | OXYGEN SATURATION: 98 % | SYSTOLIC BLOOD PRESSURE: 157 MMHG | WEIGHT: 159.6 LBS | RESPIRATION RATE: 16 BRPM

## 2022-06-20 DIAGNOSIS — Z12.31 SCREENING MAMMOGRAM, ENCOUNTER FOR: ICD-10-CM

## 2022-06-20 DIAGNOSIS — B35.1 ONYCHOMYCOSIS: Primary | ICD-10-CM

## 2022-06-20 PROCEDURE — 99214 OFFICE O/P EST MOD 30 MIN: CPT | Performed by: FAMILY MEDICINE

## 2022-06-20 RX ORDER — EFINACONAZOLE 100 MG/ML
SOLUTION TOPICAL
Qty: 8 ML | Refills: 1 | Status: SHIPPED | OUTPATIENT
Start: 2022-06-20

## 2022-06-20 NOTE — PROGRESS NOTES
Room 12     Identified pt with two pt identifiers(name and ). Reviewed record in preparation for visit and have obtained necessary documentation. All patient medications has been reviewed. Chief Complaint   Patient presents with    Labs    Toe Injury     big toe on left foot; pt stubed toe and nail came off and has not grown back        3 most recent PHQ Screens 2022   Little interest or pleasure in doing things Not at all   Feeling down, depressed, irritable, or hopeless Not at all   Total Score PHQ 2 0     Abuse Screening Questionnaire 2015   Do you ever feel afraid of your partner? N   Are you in a relationship with someone who physically or mentally threatens you? N   Is it safe for you to go home? Y       Health Maintenance Due   Topic    COVID-19 Vaccine (1)    Shingrix Vaccine Age 49> (1 of 2)    DTaP/Tdap/Td series (1 - Tdap)    Breast Cancer Screen Mammogram     Depression Screen          Health Maintenance Review: Patient reminded of \"due or due soon\" health maintenance. I have asked the patient to contact his/her primary care provider (PCP) for follow-up on his/her health maintenance.     Vitals:    22 1359 22 1406   BP: (!) 146/85 (!) 157/82   Pulse: (!) 56    Resp: 16    Temp: 98 °F (36.7 °C)    TempSrc: Oral    SpO2: 98%    Weight: 159 lb 9.6 oz (72.4 kg)    Height: 5' 9\" (1.753 m)    PainSc:   0 - No pain      Waist Circumference: 33.5 inches     Wt Readings from Last 3 Encounters:   22 159 lb 9.6 oz (72.4 kg)   21 172 lb (78 kg)   21 172 lb 14.4 oz (78.4 kg)     Temp Readings from Last 3 Encounters:   22 98 °F (36.7 °C) (Oral)   21 98.6 °F (37 °C)   21 98.4 °F (36.9 °C)     BP Readings from Last 3 Encounters:   22 (!) 157/82   21 (!) 141/90   21 137/86     Pulse Readings from Last 3 Encounters:   22 (!) 56   21 75   21 60       Coordination of Care Questionnaire:   1) Have you been to an emergency room, urgent care, or hospitalized since your last visit?   no       2. Have seen or consulted any other health care provider since your last visit? NO    Patient is accompanied by self I have received verbal consent from Silver Montoya to discuss any/all medical information while they are present in the room.

## 2022-06-20 NOTE — PROGRESS NOTES
Chief Complaint   Patient presents with    Labs    Toe Injury     big toe on left foot; pt stubed toe and nail came off and has not grown back      she is a 59y.o. year old female who presents for left big toe nail falling off 2 weeks ago. Patient states that she does not remember stubbing it but that the nail peeled off and has not been regrown since. Denies any pain pus or drainage and no previous similar symptoms. Reviewed and agree with Nurse Note and duplicated in this note. Reviewed PmHx, RxHx, FmHx, SocHx, AllgHx and updated and dated in the chart.     Family History   Problem Relation Age of Onset    Alcohol abuse Father     Liver Disease Father         cirrhosis    Stroke Maternal Grandmother     Prostate Cancer Maternal Grandfather     Heart Disease Mother         Has Debrillator    Hypertension Mother     Pacemaker Mother     Arrhythmia Mother     No Known Problems Sister     No Known Problems Brother     No Known Problems Sister     Anesth Problems Neg Hx        Past Medical History:   Diagnosis Date    Adult acne     Arthritis     History of colonic polyps      study normal, f/u 5 years Rajani Burows)    Hypertension     HISTORY NOT TAKING ANY MEDS CURRENTLY( 20)    Intestinal obstruction without gangrene due to right inguinal hernia 2020    Lipoma     right upper extremity    Post-menopausal     Right inguinal hernia 3/16/2016    Wears dentures       Social History     Socioeconomic History    Marital status: SINGLE   Occupational History    Occupation: [employer]    Tobacco Use    Smoking status: Former Smoker     Packs/day: 0.00     Years: 4.00     Pack years: 0.00     Types: Cigarettes     Quit date: 1991     Years since quittin.4    Smokeless tobacco: Never Used    Tobacco comment: SMOKED 1 CIGARRETTE DAILY FOR 4 YEARS   Substance and Sexual Activity    Alcohol use: No     Alcohol/week: 0.0 standard drinks    Drug use: No    Sexual activity: Never   Other Topics Concern     Service No    Blood Transfusions No    Caffeine Concern Yes     Comment: 0-1 cup daily (previously 1-2 per day)    Occupational Exposure No    Sleep Concern Yes     Comment: restless sleep    Stress Concern No    Weight Concern No    Special Diet No    Exercise Yes     Comment: walks routinely   Social History Narrative    Single, has two children (son and daughter). [facility]. Review of Systems - negative except as listed above      Objective:     Vitals:    06/20/22 1359 06/20/22 1406   BP: (!) 146/85 (!) 157/82   Pulse: (!) 56    Resp: 16    Temp: 98 °F (36.7 °C)    TempSrc: Oral    SpO2: 98%    Weight: 159 lb 9.6 oz (72.4 kg)    Height: 5' 9\" (1.753 m)        Physical Examination: General appearance - alert, well appearing, and in no distress  Back exam - full range of motion, no tenderness, palpable spasm or pain on motion  Neurological - alert, oriented, normal speech, no focal findings or movement disorder noted  Musculoskeletal - no joint tenderness, deformity or swelling  Extremities - peripheral pulses normal, no pedal edema, no clubbing or cyanosis  Skin - normal coloration and turgor, no rashes, no suspicious skin lesions noted  NAILS: onychomycosis of the toenails (worse on left big toe)    Assessment/ Plan:   Diagnoses and all orders for this visit:    1. Onychomycosis  -     efinaconazole (Jublia) anna topical solution; Apply to affected toenail(s) once daily for 48 weeks    2. Screening mammogram, encounter for  -     [facility] MAMMO BI SCREENING INCL CAD; Future                I have discussed the diagnosis with the patient and the intended plan as seen in the above orders. The patient has received an after-visit summary and questions were answered concerning future plans.      Medication Side Effects and Warnings were discussed with patient,  Patient Labs were reviewed and or requested, and  Patient Past Records were reviewed and or requested  yes       Pt agrees to call or return to clinic and/or go to closest ER with any worsening of symptoms. This may include, but not limited to increased fever (>100.4) with NSAIDS or Tylenol, increased edema, confusion, rash, worsening of presenting symptoms. Please note that this dictation was completed with Octavian, the computer voice recognition software. Quite often unanticipated grammatical, syntax, homophones, and other interpretive errors are inadvertently transcribed by the computer software. Please disregard these errors. Please excuse any errors that have escaped final proofreading. Thank you.

## 2022-08-25 ENCOUNTER — OFFICE VISIT (OUTPATIENT)
Dept: INTERNAL MEDICINE CLINIC | Age: 62
End: 2022-08-25

## 2022-08-25 VITALS
OXYGEN SATURATION: 100 % | RESPIRATION RATE: 15 BRPM | DIASTOLIC BLOOD PRESSURE: 83 MMHG | WEIGHT: 159 LBS | HEIGHT: 69 IN | SYSTOLIC BLOOD PRESSURE: 144 MMHG | BODY MASS INDEX: 23.55 KG/M2 | TEMPERATURE: 98.1 F | HEART RATE: 58 BPM

## 2022-08-25 DIAGNOSIS — R03.0 ELEVATED BLOOD PRESSURE READING: ICD-10-CM

## 2022-08-25 DIAGNOSIS — Z00.00 WELL WOMAN EXAM (NO GYNECOLOGICAL EXAM): Primary | ICD-10-CM

## 2022-08-25 PROCEDURE — 99396 PREV VISIT EST AGE 40-64: CPT | Performed by: FAMILY MEDICINE

## 2022-08-25 NOTE — PROGRESS NOTES
Chief Complaint   Patient presents with    Complete Physical     she is a 57y.o. year old female who presents for CPE  Complete Physical Exam Questions:    LMP -  n/a  Last Pap (q 3 years, or q5 with HPV) - unknown  Last Mammogram (54-69 biennially)- 8/15/2022  Hx of abnl Pap - Yes    1. Do you follow a low fat diet?  no  2. Are you up to date on your Tdap (<10 years)? Yes  3. Have you ever had a Pneumovax vaccine (>65)? No   PCV13 No   PPSV23 No  4. Have you had Zoster vaccine (>60)? No  5. Have you had the HPV - Gardasil (13- 26)? No  6. Do you follow an exercise program?  yes  7. Do you smoke?  no  8. Do you consider yourself overweight?  no  9. Is there a family history of CAD< age 48? No  10. Is there a family history of Cancer?  yes  11. Do you know your Cancer risks? No  12. Have you had a colonoscopy? yes  13. Have you been tested for HIV or other STI's? No HIV testing today(18-64 y/o)? No  14. Have had a bone density scan or DEXA done(>65)? No  15. Have you had an EKG performed in the last five years (>50)? Yes    Other complaints:    Reviewed and agree with Nurse Note and duplicated in this note. Reviewed PmHx, RxHx, FmHx, SocHx, AllgHx and updated and dated in the chart.     Family History   Problem Relation Age of Onset    Alcohol abuse Father     Liver Disease Father         cirrhosis    Stroke Maternal Grandmother     Prostate Cancer Maternal Grandfather     Heart Disease Mother         Has Debrillator    Hypertension Mother     Pacemaker Mother     Arrhythmia Mother     No Known Problems Sister     No Known Problems Brother     No Known Problems Sister     Anesth Problems Neg Hx        Past Medical History:   Diagnosis Date    Adult acne     Arthritis     History of colonic polyps     2014 study normal, f/u 5 years Amedeo Mick)    Hypertension     HISTORY NOT TAKING ANY MEDS CURRENTLY( 11-25-20)    Intestinal obstruction without gangrene due to right inguinal hernia 11/23/2020    Lipoma right upper extremity    Post-menopausal     Right inguinal hernia 3/16/2016    Wears dentures       Social History     Socioeconomic History    Marital status: SINGLE   Occupational History    Occupation: [employer]   Tobacco Use    Smoking status: Former     Packs/day: 0.00     Years: 4.00     Pack years: 0.00     Types: Cigarettes     Quit date: 1991     Years since quittin.6    Smokeless tobacco: Never    Tobacco comments:     SMOKED 1 CIGARRETTE DAILY FOR 4 YEARS   Substance and Sexual Activity    Alcohol use: No     Alcohol/week: 0.0 standard drinks    Drug use: No    Sexual activity: Never   Other Topics Concern     Service No    Blood Transfusions No    Caffeine Concern Yes     Comment: 0-1 cup daily (previously 1-2 per day)    Occupational Exposure No    Sleep Concern Yes     Comment: restless sleep    Stress Concern No    Weight Concern No    Special Diet No    Exercise Yes     Comment: walks routinely   Social History Narrative    Single, has two children (son and daughter). [employer].           Review of Systems - negative except as listed above      Objective:     Vitals:    22 1105   BP: (!) 144/83   Pulse: (!) 58   Resp: 15   Temp: 98.1 °F (36.7 °C)   SpO2: 100%   Weight: 159 lb (72.1 kg)   Height: 5' 9\" (1.753 m)       Physical Examination: General appearance - alert, well appearing, and in no distress  Eyes - pupils equal and reactive, extraocular eye movements intact  Ears - bilateral TM's and external ear canals normal  Nose - normal and patent, no erythema, discharge or polyps  Mouth - mucous membranes moist, pharynx normal without lesions  Neck - supple, no significant adenopathy  Chest - clear to auscultation, no wheezes, rales or rhonchi, symmetric air entry  Heart - normal rate, regular rhythm, normal S1, S2, no murmurs, rubs, clicks or gallops  Abdomen - soft, nontender, nondistended, no masses or organomegaly  Musculoskeletal - no joint tenderness, deformity or swelling  Extremities - peripheral pulses normal, no pedal edema, no clubbing or cyanosis  Skin - normal coloration and turgor, no rashes, no suspicious skin lesions noted      Assessment/ Plan:   Diagnoses and all orders for this visit:    1. Well woman exam (no gynecological exam)  -     CBC W/O DIFF; Future  -     LIPID PANEL; Future  -     METABOLIC PANEL, COMPREHENSIVE; Future    2. Elevated blood pressure reading       Patient will work on Meme and continue exercising 30 minutes a day 5 days a week. Return to clinic in 4 weeks with blood pressure log and consider blood pressure medication at time    Labs to be drawn: CBC, CMP, Lipid            I have discussed the diagnosis with the patient and the intended plan as seen in the above orders. The patient has received an after-visit summary and questions were answered concerning future plans. Medication Side Effects and Warnings were discussed with patient,  Patient Labs were reviewed and or requested, and  Patient Past Records were reviewed and or requested  yes         Pt agrees to call or return to clinic and/or go to closest ER with any worsening of symptoms. This may include, but not limited to increased fever (>100.4) with NSAIDS or Tylenol, increased edema, confusion, rash, worsening of presenting symptoms. Please note that this dictation was completed with MeMeMe, the computer voice recognition software. Quite often unanticipated grammatical, syntax, homophones, and other interpretive errors are inadvertently transcribed by the computer software. Please disregard these errors. Please excuse any errors that have escaped final proofreading. Thank you.

## 2022-08-25 NOTE — PATIENT INSTRUCTIONS
DASH Diet: Care Instructions  Your Care Instructions     The DASH diet is an eating plan that can help lower your blood pressure. DASH stands for Dietary Approaches to Stop Hypertension. Hypertension is high blood pressure. The DASH diet focuses on eating foods that are high in calcium, potassium, and magnesium. These nutrients can lower blood pressure. The foods that are highest in these nutrients are fruits, vegetables, low-fat dairy products, nuts, seeds, and legumes. But taking calcium, potassium, and magnesium supplements instead of eating foods that are high in those nutrients does not have the same effect. The DASH diet also includes whole grains, fish, and poultry. The DASH diet is one of several lifestyle changes your doctor may recommend to lower your high blood pressure. Your doctor may also want you to decrease the amount of sodium in your diet. Lowering sodium while following the DASH diet can lower blood pressure even further than just the DASH diet alone. Follow-up care is a key part of your treatment and safety. Be sure to make and go to all appointments, and call your doctor if you are having problems. It's also a good idea to know your test results and keep a list of the medicines you take. How can you care for yourself at home? Following the DASH diet  Eat 4 to 5 servings of fruit each day. A serving is 1 medium-sized piece of fruit, ½ cup chopped or canned fruit, 1/4 cup dried fruit, or 4 ounces (½ cup) of fruit juice. Choose fruit more often than fruit juice. Eat 4 to 5 servings of vegetables each day. A serving is 1 cup of lettuce or raw leafy vegetables, ½ cup of chopped or cooked vegetables, or 4 ounces (½ cup) of vegetable juice. Choose vegetables more often than vegetable juice. Get 2 to 3 servings of low-fat and fat-free dairy each day. A serving is 8 ounces of milk, 1 cup of yogurt, or 1 ½ ounces of cheese. Eat 6 to 8 servings of grains each day.  A serving is 1 slice of bread, 1 ounce of dry cereal, or ½ cup of cooked rice, pasta, or cooked cereal. Try to choose whole-grain products as much as possible. Limit lean meat, poultry, and fish to 2 servings each day. A serving is 3 ounces, about the size of a deck of cards. Eat 4 to 5 servings of nuts, seeds, and legumes (cooked dried beans, lentils, and split peas) each week. A serving is 1/3 cup of nuts, 2 tablespoons of seeds, or ½ cup of cooked beans or peas. Limit fats and oils to 2 to 3 servings each day. A serving is 1 teaspoon of vegetable oil or 2 tablespoons of salad dressing. Limit sweets and added sugars to 5 servings or less a week. A serving is 1 tablespoon jelly or jam, ½ cup sorbet, or 1 cup of lemonade. Eat less than 2,300 milligrams (mg) of sodium a day. If you limit your sodium to 1,500 mg a day, you can lower your blood pressure even more. Be aware that all of these are the suggested number of servings for people who eat 1,800 to 2,000 calories a day. Your recommended number of servings may be different if you need more or fewer calories. Tips for success  Start small. Do not try to make dramatic changes to your diet all at once. You might feel that you are missing out on your favorite foods and then be more likely to not follow the plan. Make small changes, and stick with them. Once those changes become habit, add a few more changes. Try some of the following:  Make it a goal to eat a fruit or vegetable at every meal and at snacks. This will make it easy to get the recommended amount of fruits and vegetables each day. Try yogurt topped with fruit and nuts for a snack or healthy dessert. Add lettuce, tomato, cucumber, and onion to sandwiches. Combine a ready-made pizza crust with low-fat mozzarella cheese and lots of vegetable toppings. Try using tomatoes, squash, spinach, broccoli, carrots, cauliflower, and onions.   Have a variety of cut-up vegetables with a low-fat dip as an appetizer instead of chips and dip.  Sprinkle sunflower seeds or chopped almonds over salads. Or try adding chopped walnuts or almonds to cooked vegetables. Try some vegetarian meals using beans and peas. Add garbanzo or kidney beans to salads. Make burritos and tacos with mashed aguilar beans or black beans. Where can you learn more? Go to http://www.pal[employer].com/  Enter H967 in the search box to learn more about \"DASH Diet: Care Instructions. \"  Current as of: January 10, 2022               Content Version: 13.2  © 1284-3366 [employer]. Care instructions adapted under license by [employer] (which disclaims liability or warranty for this information). If you have questions about a medical condition or this instruction, always ask your healthcare professional. [address] any warranty or liability for your use of this information.

## 2022-08-26 LAB
ALBUMIN SERPL-MCNC: 4.3 G/DL (ref 3.8–4.8)
ALBUMIN/GLOB SERPL: 2 {RATIO} (ref 1.2–2.2)
ALP SERPL-CCNC: 90 IU/L (ref 44–121)
ALT SERPL-CCNC: 7 IU/L (ref 0–32)
AST SERPL-CCNC: 11 IU/L (ref 0–40)
BILIRUB SERPL-MCNC: 0.3 MG/DL (ref 0–1.2)
BUN SERPL-MCNC: 17 MG/DL (ref 8–27)
BUN/CREAT SERPL: 18 (ref 12–28)
CALCIUM SERPL-MCNC: 8.7 MG/DL (ref 8.7–10.3)
CHLORIDE SERPL-SCNC: 107 MMOL/L (ref 96–106)
CHOLEST SERPL-MCNC: 155 MG/DL (ref 100–199)
CO2 SERPL-SCNC: 26 MMOL/L (ref 20–29)
CREAT SERPL-MCNC: 0.93 MG/DL (ref 0.57–1)
EGFR: 69 ML/MIN/1.73
ERYTHROCYTE [DISTWIDTH] IN BLOOD BY AUTOMATED COUNT: 12.8 % (ref 11.7–15.4)
GLOBULIN SER CALC-MCNC: 2.2 G/DL (ref 1.5–4.5)
GLUCOSE SERPL-MCNC: 95 MG/DL (ref 65–99)
HCT VFR BLD AUTO: 35 % (ref 34–46.6)
HDLC SERPL-MCNC: 86 MG/DL
HGB BLD-MCNC: 11.2 G/DL (ref 11.1–15.9)
LDLC SERPL CALC-MCNC: 60 MG/DL (ref 0–99)
MCH RBC QN AUTO: 29.5 PG (ref 26.6–33)
MCHC RBC AUTO-ENTMCNC: 32 G/DL (ref 31.5–35.7)
MCV RBC AUTO: 92 FL (ref 79–97)
PLATELET # BLD AUTO: 187 X10E3/UL (ref 150–450)
POTASSIUM SERPL-SCNC: 4 MMOL/L (ref 3.5–5.2)
PROT SERPL-MCNC: 6.5 G/DL (ref 6–8.5)
RBC # BLD AUTO: 3.8 X10E6/UL (ref 3.77–5.28)
SODIUM SERPL-SCNC: 144 MMOL/L (ref 134–144)
TRIGL SERPL-MCNC: 39 MG/DL (ref 0–149)
VLDLC SERPL CALC-MCNC: 9 MG/DL (ref 5–40)
WBC # BLD AUTO: 2.6 X10E3/UL (ref 3.4–10.8)

## 2022-12-19 ENCOUNTER — OFFICE VISIT (OUTPATIENT)
Dept: FAMILY MEDICINE CLINIC | Age: 62
End: 2022-12-19
Payer: COMMERCIAL

## 2022-12-19 VITALS
WEIGHT: 162.4 LBS | HEART RATE: 67 BPM | BODY MASS INDEX: 24.05 KG/M2 | TEMPERATURE: 97.3 F | SYSTOLIC BLOOD PRESSURE: 141 MMHG | HEIGHT: 69 IN | OXYGEN SATURATION: 99 % | DIASTOLIC BLOOD PRESSURE: 87 MMHG | RESPIRATION RATE: 16 BRPM

## 2022-12-19 DIAGNOSIS — L60.0 INGROWN RIGHT BIG TOENAIL: ICD-10-CM

## 2022-12-19 DIAGNOSIS — I10 BENIGN ESSENTIAL HTN: ICD-10-CM

## 2022-12-19 DIAGNOSIS — M79.89 SWELLING OF RIGHT HAND: Primary | ICD-10-CM

## 2022-12-19 PROCEDURE — 99214 OFFICE O/P EST MOD 30 MIN: CPT | Performed by: NURSE PRACTITIONER

## 2022-12-19 PROCEDURE — 3074F SYST BP LT 130 MM HG: CPT | Performed by: NURSE PRACTITIONER

## 2022-12-19 PROCEDURE — 3078F DIAST BP <80 MM HG: CPT | Performed by: NURSE PRACTITIONER

## 2022-12-19 NOTE — PROGRESS NOTES
HISTORY OF PRESENT ILLNESS  Smooth Ding is a 57 y.o. female. HPI  Acute visit. C/o discomfort of right great toe last week. Has been applying antibiotic ointment. C/o discoloration, discomfort resolved. C/o swelling of multiple joints or right hand with intermittent tenderness. Right hand dominant. BP noted to be elevated today in office. PMH of HTN. Declines medication due to risk of med SE. Patient Active Problem List   Diagnosis Code    Seasonal allergic rhinitis J30.2    Benign essential HTN I10    Intestinal obstruction without gangrene due to right inguinal hernia K40.30     Current Outpatient Medications   Medication Sig    MULTIVITAMIN PO Take  by mouth daily. No current facility-administered medications for this visit. Social History     Tobacco Use    Smoking status: Former     Packs/day: 0.00     Years: 4.00     Pack years: 0.00     Types: Cigarettes     Quit date: 1991     Years since quittin.9    Smokeless tobacco: Never    Tobacco comments:     SMOKED 1 CIGARRETTE DAILY FOR 4 YEARS   Substance Use Topics    Alcohol use: No     Alcohol/week: 0.0 standard drinks    Drug use: No     Blood pressure (!) 141/87, pulse 67, temperature 97.3 °F (36.3 °C), temperature source Temporal, resp. rate 16, height 5' 9\" (1.753 m), weight 162 lb 6.4 oz (73.7 kg), SpO2 99 %. Review of Systems   Constitutional:  Negative for chills, fever and malaise/fatigue. Respiratory:  Negative for shortness of breath. Cardiovascular:  Negative for chest pain, palpitations and leg swelling. Musculoskeletal:  Positive for joint pain. Skin:         As stated HPI. Neurological:  Negative for dizziness and headaches. All other systems reviewed and are negative. Physical Exam  Constitutional:       General: She is not in acute distress. Cardiovascular:      Rate and Rhythm: Normal rate and regular rhythm. Heart sounds: Normal heart sounds.    Pulmonary:      Effort: Pulmonary effort is normal.      Breath sounds: Normal breath sounds. Musculoskeletal:      Right hand: Swelling and tenderness present. Normal range of motion. Normal strength. Decreased capillary refill. Left hand: Normal.      Cervical back: Neck supple. Right lower leg: No edema. Left lower leg: No edema. Comments: Enlarged right middle PIP joint. Mild TTP. Generalized edema of finger noted. No erythema or warmth. Right pinky finger and index finger with DIP nodule. Lymphadenopathy:      Cervical: No cervical adenopathy. Skin:     General: Skin is warm and dry. Comments: Right great toe with hyperpigmentation surrounding nail. No erythema or warmth. Nail ingrown medial aspect. No swelling. Neurological:      Mental Status: She is alert. ASSESSMENT and PLAN  Diagnoses and all orders for this visit:    1. Swelling of right hand  -     XR HAND RT MIN 3 V; Future  Probable osteoarthritis. Recommend OTC analgesia. Topical ointments such as icy hot or bio freeze gel. 2. Benign essential HTN  Uncontrolled. Declined medication. Discussed potential risks of untreated HTN. 3. Ingrown right big toenail  Symptoms improving. Continue to wash with soap and water. Wear non restrictive shoes. Do not trim nails too short. Podiatry referral if sx recur. Follow up 4 weeks to recheck BP. We discussed the expected course, resolution and complications of the diagnosis in detail. Medication risks, benefits, costs, interactions and side effects were discussed. The patient was advised to contact the office for worsening of condition or FTI as anticipated. The patient expressed understanding of the diagnosis and plan.

## 2022-12-19 NOTE — PROGRESS NOTES
Chief Complaint   Patient presents with    Toe Pain     Big toe pain on right foot    Hand Pain     Fingers on right hands swelling with some pain. Abdominal Pain         1. \"Have you been to the ER, urgent care clinic since your last visit? Hospitalized since your last visit? \" No    2. \"Have you seen or consulted any other health care providers outside of the [address] [place] [address] since your last visit? \" No     3. For patients aged 39-70: Has the patient had a colonoscopy / FIT/ Cologuard? Yes - no Care Gap present      If the patient is female:    4. For patients aged 41-77: Has the patient had a mammogram within the past 2 years? Yes - no Care Gap present      5. For patients aged 21-65: Has the patient had a pap smear?  NA - based on age or sex         3 most recent PHQ Screens 12/19/2022   Little interest or pleasure in doing things Not at all   Feeling down, depressed, irritable, or hopeless Not at all   Total Score PHQ 2 0       Health Maintenance Due   Topic Date Due    COVID-19 Vaccine (1) Never done    Shingrix Vaccine Age 50> (1 of 2) Never done    DTaP/Tdap/Td series (1 - Tdap) 03/15/2016    Breast Cancer Screen Mammogram  02/13/2021    Flu Vaccine (1) Never done

## 2022-12-20 ENCOUNTER — TELEPHONE (OUTPATIENT)
Dept: FAMILY MEDICINE CLINIC | Age: 62
End: 2022-12-20

## 2023-01-23 ENCOUNTER — OFFICE VISIT (OUTPATIENT)
Dept: FAMILY MEDICINE CLINIC | Age: 63
End: 2023-01-23
Payer: COMMERCIAL

## 2023-01-23 VITALS
WEIGHT: 162 LBS | HEIGHT: 69 IN | DIASTOLIC BLOOD PRESSURE: 67 MMHG | BODY MASS INDEX: 23.99 KG/M2 | HEART RATE: 67 BPM | RESPIRATION RATE: 16 BRPM | SYSTOLIC BLOOD PRESSURE: 131 MMHG | OXYGEN SATURATION: 99 % | TEMPERATURE: 97.9 F

## 2023-01-23 DIAGNOSIS — M19.041 ARTHRITIS OF RIGHT HAND: ICD-10-CM

## 2023-01-23 DIAGNOSIS — M79.641 RIGHT HAND PAIN: ICD-10-CM

## 2023-01-23 DIAGNOSIS — I10 BENIGN ESSENTIAL HTN: Primary | ICD-10-CM

## 2023-01-23 PROCEDURE — 3075F SYST BP GE 130 - 139MM HG: CPT | Performed by: NURSE PRACTITIONER

## 2023-01-23 PROCEDURE — 99213 OFFICE O/P EST LOW 20 MIN: CPT | Performed by: NURSE PRACTITIONER

## 2023-01-23 PROCEDURE — 3078F DIAST BP <80 MM HG: CPT | Performed by: NURSE PRACTITIONER

## 2023-01-23 NOTE — PROGRESS NOTES
HISTORY OF PRESENT ILLNESS  Kev Avalos is a 57 y.o. female. HPI  Follow up HTN. Patient has declined medications in the past.  Follows healthy diet, limits sodium. Stays active. BP improved today in office. Continues to c/o pain and swelling of joints in right hand. Xrays done showed osteoarthritis. She has tried topical analgesia as directed with little sx improvement. Patient Active Problem List   Diagnosis Code    Seasonal allergic rhinitis J30.2    Benign essential HTN I10    Intestinal obstruction without gangrene due to right inguinal hernia K40.30     Current Outpatient Medications   Medication Sig    MULTIVITAMIN PO Take  by mouth daily. No current facility-administered medications for this visit. Social History     Tobacco Use    Smoking status: Former     Packs/day: 0.00     Years: 4.00     Pack years: 0.00     Types: Cigarettes     Quit date: 1991     Years since quittin.0    Smokeless tobacco: Never    Tobacco comments:     SMOKED 1 CIGARRETTE DAILY FOR 4 YEARS   Substance Use Topics    Alcohol use: No     Alcohol/week: 0.0 standard drinks    Drug use: No     Blood pressure 131/67, pulse 67, temperature 97.9 °F (36.6 °C), temperature source Temporal, resp. rate 16, height 5' 9\" (1.753 m), weight 162 lb (73.5 kg), SpO2 99 %. Review of Systems   Respiratory:  Negative for shortness of breath. Cardiovascular:  Negative for chest pain, palpitations and leg swelling. Musculoskeletal:  Positive for joint pain. Neurological:  Negative for dizziness, sensory change and headaches. All other systems reviewed and are negative. Physical Exam  Constitutional:       General: She is not in acute distress. Cardiovascular:      Rate and Rhythm: Normal rate and regular rhythm. Heart sounds: Normal heart sounds. Pulmonary:      Effort: Pulmonary effort is normal.      Breath sounds: Normal breath sounds.    Musculoskeletal:      Right hand: Swelling (right middle MCP joint) and tenderness present. Normal range of motion. Left hand: Normal.      Cervical back: Neck supple. Right lower leg: No edema. Left lower leg: No edema. Lymphadenopathy:      Cervical: No cervical adenopathy. Skin:     General: Skin is warm and dry. Neurological:      Mental Status: She is alert. ASSESSMENT and PLAN  Diagnoses and all orders for this visit:    1. Benign essential HTN  Controlled today. Patient declines medication. Recommend interim BP monitoring and record. Report if > 130/80 consistently. 2. Arthritis of right hand  -     REFERRAL TO ORTHOPEDICS  Contact info given to patient to schedule appointment for further evaluation. 3. Right hand pain  -     REFERRAL TO ORTHOPEDICS  Follow up 6 months. We discussed the expected course, resolution and complications of the diagnosis in detail. Medication risks, benefits, costs, interactions and side effects were discussed. The patient was advised to contact the office for worsening of condition or FTI as anticipated. The patient expressed understanding of the diagnosis and plan.

## 2023-01-23 NOTE — PROGRESS NOTES
Chief Complaint   Patient presents with    Hypertension     Follow up    Hand Pain     Right hand pain         1. \"Have you been to the ER, urgent care clinic since your last visit? Hospitalized since your last visit? \" No    2. \"Have you seen or consulted any other health care providers outside of the [address] [place] [address] since your last visit? \" No     3. For patients aged 39-70: Has the patient had a colonoscopy / FIT/ Cologuard? Yes - no Care Gap present      If the patient is female:    4. For patients aged 41-77: Has the patient had a mammogram within the past 2 years? Yes - no Care Gap present      5. For patients aged 21-65: Has the patient had a pap smear?  NA - based on age or sex         3 most recent PHQ Screens 1/23/2023   Little interest or pleasure in doing things Not at all   Feeling down, depressed, irritable, or hopeless Not at all   Total Score PHQ 2 0       Health Maintenance Due   Topic Date Due    COVID-19 Vaccine (1) Never done    Shingles Vaccine (1 of 2) Never done    DTaP/Tdap/Td series (1 - Tdap) 03/15/2016    Colorectal Cancer Screening Combo  04/18/2019    Flu Vaccine (1) Never done

## 2023-03-06 NOTE — PROGRESS NOTES
HPI: Shoaib Allen (: 1960) is a 57 y.o. female, patient, here for evaluation of the following chief complaint(s):    Hand Pain (Pt stated she has had right hand pain only sometimes, numbness only sometimes and no tingling x 4--5 months.)  Patient is seen today to evaluate her hands. The patient in particular complains about her right hand stating that since  she had pain and numbness intermittently. She denies any specific fall or other injury. She seems to deny more tingling but does admit to paresthesias at night that can awaken her from sleep. She has developed a cyst versus mass to the volar ulnar base of the ring finger. She has thickening of the PIP more to the middle than the ring finger and some ulnar deviation and slight flexion of each digit at the PIP. Vitals:  Ht 5' 9\" (1.753 m)   Wt 162 lb (73.5 kg)   BMI 23.92 kg/m²    Body mass index is 23.92 kg/m². Allergies   Allergen Reactions    Pcn [Penicillins] Hives     itching       Current Outpatient Medications   Medication Sig    methylPREDNISolone (MEDROL DOSEPACK) 4 mg tablet Per dose pack instructions    MULTIVITAMIN PO Take  by mouth daily. No current facility-administered medications for this visit.        Past Medical History:   Diagnosis Date    Adult acne     Arthritis     History of colonic polyps      study normal, f/u 5 years [facility])    Hypertension     HISTORY NOT TAKING ANY MEDS CURRENTLY( 20)    Intestinal obstruction without gangrene due to right inguinal hernia 2020    Lipoma     right upper extremity    Post-menopausal     Right inguinal hernia 3/16/2016    Wears dentures         Past Surgical History:   Procedure Laterality Date    HX COLONOSCOPY      due  (polyp)    HX HERNIA REPAIR  2020    Repair of right inguinal hernia with plug and patch-[facility]-Dr. Shelley Glass    HX HYSTERECTOMY      no cervix ovaries left in - done for menorrhagia    HX TONSILLECTOMY      NE UNLISTED PROCEDURE BREAST Left     BREAST BX       Family History   Problem Relation Age of Onset    Alcohol abuse Father     Liver Disease Father         cirrhosis    Stroke Maternal Grandmother     Prostate Cancer Maternal Grandfather     Heart Disease Mother         Has Debrillator    Hypertension Mother     Pacemaker Mother     Arrhythmia Mother     No Known Problems Sister     No Known Problems Brother     No Known Problems Sister     Anesth Problems Neg Hx         Social History     Tobacco Use    Smoking status: Former     Packs/day: 0.00     Years: 4.00     Pack years: 0.00     Types: Cigarettes     Quit date: 1991     Years since quittin.2    Smokeless tobacco: Never    Tobacco comments:     SMOKED 1 CIGARRETTE DAILY FOR 4 YEARS   Substance Use Topics    Alcohol use: No     Alcohol/week: 0.0 standard drinks    Drug use: No        Review of Systems   All other systems reviewed and are negative. Physical Exam    Patient's right hand has evidence of degenerative arthritic changes especially the PIP region of the right middle more than ring finger with is about a 20 degree resting PIP flexed position although full passive extension is nearly possible. There is also slight ulnar angulation of the PIP of each digit a little more pronounced in the ring in the middle finger. She has equivocal to positive Tinel's Phalen's and nerve compression test at the carpal tunnel that can reproduce some paresthesias in the median distribution. Imaging:    XR Results (most recent):  Results from Appointment encounter on 22    XR HAND RT MIN 3 V    Narrative  EXAM: XR HAND RT MIN 3 V    INDICATION: Right hand swelling. COMPARISON: 2021    FINDINGS: Three views of the right hand demonstrate no acute fracture or bony  erosion. There is stable mild diffuse joint space narrowing of the digits.   Periventricular soft tissue swelling is again noted predominantly at the second  and third PIP and DIP joints. Impression  No acute abnormality. Stable diffuse degenerative changes of the  digits in keeping with osteoarthritis. ASSESSMENT/PLAN:  Below is the assessment and plan developed based on review of pertinent history, physical exam, labs, studies, and medications. Patient examination was clinically consistent with a mild degree of bilateral hand arthritis especially PIP of right middle and ring finger, tendinitis versus carpal tunnel syndrome. She also has a volar ulnar cyst versus mass involving the ring finger. I reviewed treatment options and answered her questions. I recommended a Medrol Dosepak and a EMG to evaluate for carpal tunnel. Future consideration will need to be given for excision of the cyst versus mass involving the ring finger and possibly include a carpal tunnel release pending the EMG result. Follow-up after EMG. 1. Right hand pain  2. Primary osteoarthritis, right hand  3. Bilateral carpal tunnel syndrome  -     EMG TWO EXTREMITIES UPPER; Future  -     methylPREDNISolone (MEDROL DOSEPACK) 4 mg tablet; Per dose pack instructions, Normal, Disp-1 Dose Pack, R-0  -     REFERRAL TO PHYSIATRY  4. Ganglion cyst of flexor tendon sheath of finger of right hand      Return for Follow-up after EMG test completion. An electronic signature was used to authenticate this note.   -- Nelson Sandifer, MD

## 2023-03-07 ENCOUNTER — OFFICE VISIT (OUTPATIENT)
Dept: ORTHOPEDIC SURGERY | Age: 63
End: 2023-03-07

## 2023-03-07 VITALS — WEIGHT: 162 LBS | BODY MASS INDEX: 23.99 KG/M2 | HEIGHT: 69 IN

## 2023-03-07 DIAGNOSIS — M79.641 RIGHT HAND PAIN: Primary | ICD-10-CM

## 2023-03-07 DIAGNOSIS — M19.041 PRIMARY OSTEOARTHRITIS, RIGHT HAND: ICD-10-CM

## 2023-03-07 DIAGNOSIS — G56.03 BILATERAL CARPAL TUNNEL SYNDROME: ICD-10-CM

## 2023-03-07 DIAGNOSIS — M67.441 GANGLION CYST OF FLEXOR TENDON SHEATH OF FINGER OF RIGHT HAND: ICD-10-CM

## 2023-03-07 RX ORDER — METHYLPREDNISOLONE 4 MG/1
TABLET ORAL
Qty: 1 DOSE PACK | Refills: 0 | Status: SHIPPED | OUTPATIENT
Start: 2023-03-07

## 2023-03-07 NOTE — PATIENT INSTRUCTIONS
Electromyogram (EMG) and Nerve Conduction Studies: About These Tests  What are they? An electromyogram (EMG) measures the electrical activity of your muscles when you are not using them (at rest) and when you tighten them (muscle contraction). Nerve conduction studies (NCS) measure how well and how fast the nerves can send electrical signals. EMG and nerve conduction studies are often done together. If they are done together, the nerve conduction studies are done before the EMG. Why are they done? You may need an EMG to find diseases that damage your muscles or nerves or to find why you can't move your muscles (paralysis), why they feel weak, or why they twitch. These problems may include a herniated disc, amyotrophic lateral sclerosis (ALS), or myasthenia gravis (MG). You may need nerve conduction studies to find damage to the nerves that lead from the brain and spinal cord to the rest of the body. (This is called the peripheral nervous system.) These studies are often used to help find nerve disorders, such as carpal tunnel syndrome. How do you prepare for these tests? Wear loose-fitting clothing. You may be given a hospital gown to wear. The electrodes for the test are attached to your skin. Your skin needs to be clean and free of sprays, oils, creams, and lotions. You may be asked to sign a consent form that says you understand the risks of the test and agree to have it done. Tell your doctor ALL the medicines, vitamins, supplements, and herbal remedies you take. Some may increase the risk of problems during your test. Your doctor will tell you if you should stop taking any of them before the test and how soon to do it. If you take a medicine that prevents blood clots, your doctor may tell you to stop taking it before your test. Or your doctor may tell you to keep taking it.  (These medicines include aspirin and other blood thinners.) Make sure that you understand exactly what your doctor wants you to do. How are the tests done? You lie on a table or bed or sit in a reclining chair so your muscles are relaxed. For an EMG:   Your doctor will insert a needle electrode into a muscle. This will record the electrical activity while the muscle is at rest.  Your doctor will ask you to tighten the same muscle slowly and steadily while the electrical activity is recorded. Your doctor may move the electrode to a different area of the muscle or a different muscle. For nerve conduction studies:   Your doctor will attach two types of electrodes to your skin. One type of electrode is placed over a nerve and will give the nerve an electrical pulse. The other type of electrode is placed over the muscle that the nerve controls. It will record how long it takes the muscle to react to the electrical pulse. How does having electromyogram (EMG) and nerve conduction studies feel? During an EMG test, you may feel a quick, sharp pain when the needle electrode is put into a muscle. With nerve conduction studies, you will be able to feel the electrical pulses. The tests make some people anxious. Keep in mind that only a very low-voltage electrical current is used. And each electrical pulse is very quick. It lasts less than a second. How long do they take? An EMG may take 30 to 60 minutes. Nerve conduction tests may take from 15 minutes to 1 hour or more. It depends on how many nerves and muscles your doctor tests. What happens after these tests? After the test, you may be sore and feel a tingling in your muscles. This may last for up to 2 days. If any of the test areas are sore:  Put ice or a cold pack on the area for 10 to 20 minutes at a time. Put a thin cloth between the ice and your skin. Take an over-the-counter pain medicine, such as acetaminophen (Tylenol), ibuprofen (Advil, Motrin), or naproxen (Aleve). Be safe with medicines. Read and follow all instructions on the label.   You will probably be able to go home right away. It depends on the reason for the test.  You can go back to your usual activities right away. When should you call for help? Watch closely for changes in your health, and be sure to contact your doctor if:  Muscle pain from an EMG test gets worse or you have swelling, tenderness, or pus at any of the needle sites. You have any problems that you think may be from the test.  You have any questions about the test or have not received your results. Follow-up care is a key part of your treatment and safety. Be sure to make and go to all appointments, and call your doctor if you are having problems. It's also a good idea to keep a list of the medicines you take. Ask your doctor when you can expect to have your test results. Where can you learn more? Go to http://www.gray.com/  Enter [id number] in the search box to learn more about \"Electromyogram (EMG) and Nerve Conduction Studies: About These Tests. \"  Current as of: December 13, 2021               Content Version: 13.4  © 2006-2022 [employer]. Care instructions adapted under license by [employer] (which disclaims liability or warranty for this information). If you have questions about a medical condition or this instruction, always ask your healthcare professional. [place] [address] any warranty or liability for your use of this information. Carpal Tunnel Syndrome: Care Instructions  Overview     Carpal tunnel syndrome is numbness, tingling, weakness, and pain in your hand, wrist, and sometimes forearm. It is caused by pressure on the median nerve. This nerve and several tough tissues called tendons run through a space in the wrist. This space is called the carpal tunnel. The repeated hand motions used in work and some hobbies and sports can put pressure on the median nerve. Pregnancy can cause carpal tunnel syndrome.  Several conditions, such as diabetes, arthritis, and an underactive thyroid, can also cause it. You may be able to limit an activity or change the way you do it to reduce your symptoms. You also can take other steps to feel better. If your symptoms are mild, 1 to 2 weeks of home treatment are likely to ease your pain. Surgery is needed only if other treatments do not work. Follow-up care is a key part of your treatment and safety. Be sure to make and go to all appointments, and call your doctor if you are having problems. It's also a good idea to know your test results and keep a list of the medicines you take. How can you care for yourself at home? If possible, stop or reduce the activity that causes your symptoms. If you cannot stop the activity, take frequent breaks to rest and stretch or change hand positions to do a task. Try switching hands, such as when using a computer mouse. Try to avoid bending or twisting your wrists. Ask your doctor if you can take an over-the-counter pain medicine, such as acetaminophen (Tylenol), ibuprofen (Advil, Motrin), or naproxen (Aleve). Be safe with medicines. Read and follow all instructions on the label. If your doctor prescribes corticosteroid medicine to help reduce pain and swelling, take it exactly as prescribed. Call your doctor if you think you are having a problem with your medicine. Put ice or a cold pack on your wrist for 10 to 20 minutes at a time to ease pain. Put a thin cloth between the ice and your skin. If your doctor or your physical or occupational therapist tells you to wear a wrist splint, wear it as directed to keep your wrist in a neutral position. This also eases pressure on your median nerve. Ask your doctor whether you should have physical or occupational therapy to learn how to do tasks differently. Try a yoga class to stretch your muscles and build strength in your hands and wrists. Yoga has been shown to ease carpal tunnel symptoms.   To prevent carpal tunnel  When working at a computer, keep your hands and wrists in line with your forearms. Hold your elbows close to your sides. Take a break every 10 to 15 minutes. Try these exercises:  Warm up: Rotate your wrist up, down, and from side to side. Repeat this 4 times. Stretch your fingers far apart, relax them, then stretch them again. Repeat 4 times. Stretch your thumb by pulling it back gently, holding it, and then releasing it. Repeat 4 times. Prayer stretch: Start with your palms together in front of your chest just below your chin. Slowly lower your hands toward your waistline while keeping your hands close to your stomach and your palms together until you feel a mild to moderate stretch under your forearms. Hold for 10 to 20 seconds. Repeat 4 times. Wrist flexor stretch: Hold your arm in front of you with your palm up. Bend your wrist, pointing your hand toward the floor. With your other hand, gently bend your wrist further until you feel a mild to moderate stretch in your forearm. Hold for 10 to 20 seconds. Repeat 4 times. Wrist extensor stretch: Repeat the steps for the wrist flexor stretch, but begin with your extended hand palm down. Squeeze a rubber exercise ball several times a day to keep your hands and fingers strong. Avoid holding objects (such as a book) in one position for a long time. When possible, use your whole hand to grasp an object. Using just the thumb and index finger can put stress on the wrist.  Do not smoke. It can make this condition worse by reducing blood flow to the median nerve. If you need help quitting, talk to your doctor about stop-smoking programs and medicines. These can increase your chances of quitting for good. When should you call for help? Watch closely for changes in your health, and be sure to contact your doctor if:    Your pain or other problems do not get better with home care. You want more information about physical or occupational therapy.      You have side effects of your corticosteroid medicine, such as:  Weight gain. Mood changes. Trouble sleeping. Bruising easily. You have any other problems with your medicine. Where can you learn more? Go to http://www.gray.com/  Enter R432 in the search box to learn more about \"Carpal Tunnel Syndrome: Care Instructions. \"  Current as of: March 9, 2022               Content Version: 13.4  © 7728-7968 [employer]. Care instructions adapted under license by [employer] (which disclaims liability or warranty for this information). If you have questions about a medical condition or this instruction, always ask your healthcare professional. [address] any warranty or liability for your use of this information.

## 2023-03-07 NOTE — LETTER
3/7/2023    Patient: Ish Quispe   YOB: 1960   Date of Visit: 3/7/2023     Bola Uribe MD  [address] [facility] [address]  [address] [place] [address], NP  [address] [facility] [address]  Via In Basket    Dear MD Jewels Genao NP,      Thank you for referring Ms. Kinza Reyes to [facility] for evaluation. My notes for this consultation are attached. If you have questions, please do not hesitate to call me. I look forward to following your patient along with you.       Sincerely,    Niall Guillaume MD

## 2023-03-30 ENCOUNTER — VIRTUAL VISIT (OUTPATIENT)
Dept: INTERNAL MEDICINE CLINIC | Age: 63
End: 2023-03-30
Payer: COMMERCIAL

## 2023-03-30 ENCOUNTER — NURSE TRIAGE (OUTPATIENT)
Dept: OTHER | Facility: CLINIC | Age: 63
End: 2023-03-30

## 2023-03-30 DIAGNOSIS — N39.0 URINARY TRACT INFECTION WITHOUT HEMATURIA, SITE UNSPECIFIED: Primary | ICD-10-CM

## 2023-03-30 PROCEDURE — 99213 OFFICE O/P EST LOW 20 MIN: CPT | Performed by: FAMILY MEDICINE

## 2023-03-30 RX ORDER — SULFAMETHOXAZOLE AND TRIMETHOPRIM 800; 160 MG/1; MG/1
1 TABLET ORAL 2 TIMES DAILY
Qty: 10 TABLET | Refills: 0 | Status: SHIPPED | OUTPATIENT
Start: 2023-03-30 | End: 2023-04-04

## 2023-03-30 NOTE — PROGRESS NOTES
Merly Conner is a 57 y.o. female who was seen by synchronous (real-time) audio-video technology on 3/30/2023 for UTI        Assessment & Plan:   Diagnoses and all orders for this visit:    1. Urinary tract infection without hematuria, site unspecified  -     URINALYSIS W/ RFLX MICROSCOPIC; Future    Other orders  -     trimethoprim-sulfamethoxazole (BACTRIM DS, SEPTRA DS) 160-800 mg per tablet; Take 1 Tablet by mouth two (2) times a day for 5 days. Treatment per orders - also push fluids, may use Pyridium OTC prn. Call or return to clinic prn if these symptoms worsen or fail to improve as anticipated. Subjective:      Meryl Conner is a 57 y.o. female who complains of urinary frequency, urgency and dysuria x 1 days, without flank pain, fever, chills, or abnormal vaginal discharge or bleeding. Prior to Admission medications    Medication Sig Start Date End Date Taking? Authorizing Provider   methylPREDNISolone (MEDROL DOSEPACK) 4 mg tablet Per dose pack instructions 3/7/23   Sumeet Singh MD   MULTIVITAMIN PO Take  by mouth daily. Provider, Historical     Patient Active Problem List   Diagnosis Code    Seasonal allergic rhinitis J30.2    Benign essential HTN I10    Intestinal obstruction without gangrene due to right inguinal hernia K40.30     Patient Active Problem List    Diagnosis Date Noted    Intestinal obstruction without gangrene due to right inguinal hernia 11/23/2020    Benign essential HTN 04/19/2018    Seasonal allergic rhinitis 10/06/2014     Current Outpatient Medications   Medication Sig Dispense Refill    methylPREDNISolone (MEDROL DOSEPACK) 4 mg tablet Per dose pack instructions 1 Dose Pack 0    MULTIVITAMIN PO Take  by mouth daily.        Allergies   Allergen Reactions    Pcn [Penicillins] Hives     itching     Past Medical History:   Diagnosis Date    Adult acne     Arthritis     History of colonic polyps     2014 study normal, f/u 5 years Brandon Jay)    Hypertension     HISTORY NOT TAKING ANY MEDS CURRENTLY( 20)    Intestinal obstruction without gangrene due to right inguinal hernia 2020    Lipoma     right upper extremity    Post-menopausal     Right inguinal hernia 3/16/2016    Wears dentures      Past Surgical History:   Procedure Laterality Date    HX COLONOSCOPY      due  (polyp)    HX HERNIA REPAIR  2020    Repair of right inguinal hernia with plug and patch-[facility]-Dr. Monika Mari    HX HYSTERECTOMY      no cervix ovaries left in - done for menorrhagia    HX TONSILLECTOMY      SC UNLISTED PROCEDURE BREAST Left 80'S    BREAST BX     Family History   Problem Relation Age of Onset    Alcohol abuse Father     Liver Disease Father         cirrhosis    Stroke Maternal Grandmother     Prostate Cancer Maternal Grandfather     Heart Disease Mother         Has Debrillator    Hypertension Mother     Pacemaker Mother     Arrhythmia Mother     No Known Problems Sister     No Known Problems Brother     No Known Problems Sister     Anesth Problems Neg Hx      Social History     Tobacco Use    Smoking status: Former     Packs/day: 0.00     Years: 4.00     Pack years: 0.00     Types: Cigarettes     Quit date: 1991     Years since quittin.2    Smokeless tobacco: Never    Tobacco comments:     SMOKED 1 CIGARRETTE DAILY FOR 4 YEARS   Substance Use Topics    Alcohol use: No     Alcohol/week: 0.0 standard drinks       ROS    Objective:   No flowsheet data found.      [INSTRUCTIONS:  \"[x]\" Indicates a positive item  \"[]\" Indicates a negative item  -- DELETE ALL ITEMS NOT EXAMINED]    Constitutional: [x] Appears well-developed and well-nourished [x] No apparent distress      [] Abnormal -     Mental status: [x] Alert and awake  [x] Oriented to person/place/time [x] Able to follow commands    [] Abnormal -     Eyes:   EOM    [x]  Normal    [] Abnormal -   Sclera  [x]  Normal    [] Abnormal -          Discharge [x]  None visible   [] Abnormal -     HENT: [x] Normocephalic, atraumatic  [] Abnormal -   [x] Mouth/Throat: Mucous membranes are moist    External Ears [x] Normal  [] Abnormal -    Neck: [x] No visualized mass [] Abnormal -     Pulmonary/Chest: [x] Respiratory effort normal   [x] No visualized signs of difficulty breathing or respiratory distress        [] Abnormal -      Musculoskeletal:   [x] Normal gait with no signs of ataxia         [x] Normal range of motion of neck        [] Abnormal -     Neurological:        [x] No Facial Asymmetry (Cranial nerve 7 motor function) (limited exam due to video visit)          [x] No gaze palsy        [] Abnormal -          Skin:        [x] No significant exanthematous lesions or discoloration noted on facial skin         [] Abnormal -            Psychiatric:       [x] Normal Affect [] Abnormal -        [x] No Hallucinations    Other pertinent observable physical exam findings:-        We discussed the expected course, resolution and complications of the diagnosis(es) in detail. Medication risks, benefits, costs, interactions, and alternatives were discussed as indicated. I advised her to contact the office if her condition worsens, changes or fails to improve as anticipated. She expressed understanding with the diagnosis(es) and plan. Julissa Reynolds, was evaluated through a synchronous (real-time) audio-video encounter. The patient (or guardian if applicable) is aware that this is a billable service, which includes applicable co-pays. This Virtual Visit was conducted with patient's (and/or legal guardian's) consent. The visit was conducted pursuant to the emergency declaration under the [address] [place] [address], [address] [place] [address] and the [employer] [facility] and [employer] General Act. Patient identification was verified, and a caregiver was present when appropriate. The patient was located at: Home: [address]  The provider was located at:  Facility (Appt Department): Love Glass MD

## 2023-03-30 NOTE — TELEPHONE ENCOUNTER
Pt has already seen PCP in VV today; no  worse- no triage  Pt was sent directly to our line without ECC   Referred to PCP advice  Reason for Disposition   Caller has already spoken with another triager or PCP AND has further questions AND triager able to answer questions.     Protocols used: No Contact or Duplicate Contact Call-ADULT-AH

## 2023-05-01 ENCOUNTER — OFFICE VISIT (OUTPATIENT)
Dept: FAMILY MEDICINE CLINIC | Age: 63
End: 2023-05-01
Payer: COMMERCIAL

## 2023-05-01 VITALS
HEIGHT: 69 IN | SYSTOLIC BLOOD PRESSURE: 138 MMHG | HEART RATE: 71 BPM | DIASTOLIC BLOOD PRESSURE: 84 MMHG | RESPIRATION RATE: 17 BRPM | OXYGEN SATURATION: 98 % | TEMPERATURE: 98.3 F | WEIGHT: 161 LBS | BODY MASS INDEX: 23.85 KG/M2

## 2023-05-01 DIAGNOSIS — R03.0 ELEVATED BLOOD PRESSURE READING: Primary | ICD-10-CM

## 2023-05-01 PROCEDURE — 3075F SYST BP GE 130 - 139MM HG: CPT | Performed by: NURSE PRACTITIONER

## 2023-05-01 PROCEDURE — 99213 OFFICE O/P EST LOW 20 MIN: CPT | Performed by: NURSE PRACTITIONER

## 2023-05-01 PROCEDURE — 3079F DIAST BP 80-89 MM HG: CPT | Performed by: NURSE PRACTITIONER

## 2023-05-01 NOTE — PROGRESS NOTES
HISTORY OF PRESENT ILLNESS  Alanna Sanches is a 57 y.o. female. HPI  Follow up elevated blood pressure. BP mildly elevated at last OV at 131/67. Recommended to monitor interim BP readings. States she has been monitoring but forgot to bring log. Mildly elevated today in office. Declines medication at this time. Patient Active Problem List   Diagnosis Code    Seasonal allergic rhinitis J30.2    Benign essential HTN I10    Intestinal obstruction without gangrene due to right inguinal hernia K40.30     Current Outpatient Medications   Medication Sig    MULTIVITAMIN PO Take  by mouth daily. No current facility-administered medications for this visit. Social History     Tobacco Use    Smoking status: Former     Packs/day: 0.00     Years: 4.00     Pack years: 0.00     Types: Cigarettes     Quit date: 1991     Years since quittin.3    Smokeless tobacco: Never    Tobacco comments:     SMOKED 1 CIGARRETTE DAILY FOR 4 YEARS   Substance Use Topics    Alcohol use: No     Alcohol/week: 0.0 standard drinks    Drug use: No     Blood pressure 138/84, pulse 71, temperature 98.3 °F (36.8 °C), temperature source Temporal, resp. rate 17, height 5' 9\" (1.753 m), weight 161 lb (73 kg), SpO2 98 %. Review of Systems   Respiratory:  Negative for shortness of breath. Cardiovascular:  Negative for chest pain, palpitations and leg swelling. Neurological:  Negative for dizziness and headaches. All other systems reviewed and are negative. Physical Exam  Constitutional:       General: She is not in acute distress. Cardiovascular:      Rate and Rhythm: Normal rate and regular rhythm. Heart sounds: Normal heart sounds. Pulmonary:      Effort: Pulmonary effort is normal.      Breath sounds: Normal breath sounds. Musculoskeletal:      Cervical back: Neck supple. Right lower leg: No edema. Left lower leg: No edema. Lymphadenopathy:      Cervical: No cervical adenopathy.    Skin: General: Skin is warm and dry. Neurological:      Mental Status: She is alert. Coordination: Coordination normal.   Psychiatric:         Mood and Affect: Mood normal.       ASSESSMENT and PLAN  Diagnoses and all orders for this visit:    1. Elevated blood pressure reading  Mildly elevated today in office. Reviewed healthy, DASH diet and exercise recommendations. Continue to log interim BP. Follow up 4 months.

## 2023-05-01 NOTE — PROGRESS NOTES
Chief Complaint   Patient presents with    Hypertension     Follow up, Patient would like to make sure UTI clear up. 1. \"Have you been to the ER, urgent care clinic since your last visit? Hospitalized since your last visit? \" No    2. \"Have you seen or consulted any other health care providers outside of the [address] [place] [address] since your last visit? \" No     3. For patients aged 39-70: Has the patient had a colonoscopy / FIT/ Cologuard? Yes - no Care Gap present      If the patient is female:    4. For patients aged 41-77: Has the patient had a mammogram within the past 2 years? Yes - no Care Gap present      5. For patients aged 21-65: Has the patient had a pap smear?  NA - based on age or sex         3 most recent PHQ Screens 3/30/2023   Little interest or pleasure in doing things Not at all   Feeling down, depressed, irritable, or hopeless Not at all   Total Score PHQ 2 0       Health Maintenance Due   Topic Date Due    COVID-19 Vaccine (1) Never done    Bone Densitometry (Dexa) Screening  07/13/2023

## 2023-08-25 ENCOUNTER — OFFICE VISIT (OUTPATIENT)
Facility: CLINIC | Age: 63
End: 2023-08-25
Payer: COMMERCIAL

## 2023-08-25 VITALS
RESPIRATION RATE: 16 BRPM | HEIGHT: 69 IN | TEMPERATURE: 98 F | OXYGEN SATURATION: 95 % | WEIGHT: 161 LBS | DIASTOLIC BLOOD PRESSURE: 78 MMHG | BODY MASS INDEX: 23.85 KG/M2 | SYSTOLIC BLOOD PRESSURE: 144 MMHG | HEART RATE: 62 BPM

## 2023-08-25 DIAGNOSIS — Z00.00 WELL WOMAN EXAM (NO GYNECOLOGICAL EXAM): Primary | ICD-10-CM

## 2023-08-25 DIAGNOSIS — Z12.11 SCREENING FOR COLORECTAL CANCER: ICD-10-CM

## 2023-08-25 DIAGNOSIS — Z12.12 SCREENING FOR COLORECTAL CANCER: ICD-10-CM

## 2023-08-25 DIAGNOSIS — I10 BENIGN ESSENTIAL HTN: ICD-10-CM

## 2023-08-25 DIAGNOSIS — Z11.59 NEED FOR HEPATITIS C SCREENING TEST: ICD-10-CM

## 2023-08-25 PROCEDURE — 99397 PER PM REEVAL EST PAT 65+ YR: CPT | Performed by: FAMILY MEDICINE

## 2023-08-25 PROCEDURE — 3077F SYST BP >= 140 MM HG: CPT | Performed by: FAMILY MEDICINE

## 2023-08-25 PROCEDURE — 3078F DIAST BP <80 MM HG: CPT | Performed by: FAMILY MEDICINE

## 2023-08-26 LAB
ALBUMIN SERPL-MCNC: 4.3 G/DL (ref 3.9–4.9)
ALBUMIN/GLOB SERPL: 2 {RATIO} (ref 1.2–2.2)
ALP SERPL-CCNC: 81 IU/L (ref 44–121)
ALT SERPL-CCNC: 10 IU/L (ref 0–32)
AST SERPL-CCNC: 13 IU/L (ref 0–40)
BASOPHILS # BLD AUTO: 0 X10E3/UL (ref 0–0.2)
BASOPHILS NFR BLD AUTO: 1 %
BILIRUB SERPL-MCNC: 0.4 MG/DL (ref 0–1.2)
BUN SERPL-MCNC: 10 MG/DL (ref 8–27)
BUN/CREAT SERPL: 11 (ref 12–28)
CALCIUM SERPL-MCNC: 8.9 MG/DL (ref 8.7–10.3)
CHLORIDE SERPL-SCNC: 104 MMOL/L (ref 96–106)
CHOLEST SERPL-MCNC: 149 MG/DL (ref 100–199)
CO2 SERPL-SCNC: 26 MMOL/L (ref 20–29)
CREAT SERPL-MCNC: 0.87 MG/DL (ref 0.57–1)
EGFRCR SERPLBLD CKD-EPI 2021: 74 ML/MIN/1.73
EOSINOPHIL # BLD AUTO: 0 X10E3/UL (ref 0–0.4)
EOSINOPHIL NFR BLD AUTO: 1 %
ERYTHROCYTE [DISTWIDTH] IN BLOOD BY AUTOMATED COUNT: 12.4 % (ref 11.7–15.4)
GLOBULIN SER CALC-MCNC: 2.1 G/DL (ref 1.5–4.5)
GLUCOSE SERPL-MCNC: 94 MG/DL (ref 70–99)
HBA1C MFR BLD: 5.8 % (ref 4.8–5.6)
HCT VFR BLD AUTO: 35.1 % (ref 34–46.6)
HCV IGG SERPL QL IA: NON REACTIVE
HDLC SERPL-MCNC: 81 MG/DL
HGB BLD-MCNC: 11.6 G/DL (ref 11.1–15.9)
HIV 1+2 AB+HIV1 P24 AG SERPL QL IA: NON REACTIVE
IMM GRANULOCYTES # BLD AUTO: 0 X10E3/UL (ref 0–0.1)
IMM GRANULOCYTES NFR BLD AUTO: 0 %
LDLC SERPL CALC-MCNC: 58 MG/DL (ref 0–99)
LYMPHOCYTES # BLD AUTO: 1.1 X10E3/UL (ref 0.7–3.1)
LYMPHOCYTES NFR BLD AUTO: 47 %
MCH RBC QN AUTO: 30.6 PG (ref 26.6–33)
MCHC RBC AUTO-ENTMCNC: 33 G/DL (ref 31.5–35.7)
MCV RBC AUTO: 93 FL (ref 79–97)
MONOCYTES # BLD AUTO: 0.2 X10E3/UL (ref 0.1–0.9)
MONOCYTES NFR BLD AUTO: 7 %
NEUTROPHILS # BLD AUTO: 1.1 X10E3/UL (ref 1.4–7)
NEUTROPHILS NFR BLD AUTO: 44 %
PLATELET # BLD AUTO: 187 X10E3/UL (ref 150–450)
POTASSIUM SERPL-SCNC: 3.7 MMOL/L (ref 3.5–5.2)
PROT SERPL-MCNC: 6.4 G/DL (ref 6–8.5)
RBC # BLD AUTO: 3.79 X10E6/UL (ref 3.77–5.28)
SODIUM SERPL-SCNC: 143 MMOL/L (ref 134–144)
TRIGL SERPL-MCNC: 40 MG/DL (ref 0–149)
VLDLC SERPL CALC-MCNC: 10 MG/DL (ref 5–40)
WBC # BLD AUTO: 2.4 X10E3/UL (ref 3.4–10.8)

## 2023-08-28 DIAGNOSIS — D72.819 LEUKOPENIA, UNSPECIFIED TYPE: Primary | ICD-10-CM

## 2023-11-13 DIAGNOSIS — D72.819 LEUKOPENIA, UNSPECIFIED TYPE: ICD-10-CM

## 2023-11-15 LAB
BASOPHILS # BLD AUTO: 0 X10E3/UL (ref 0–0.2)
BASOPHILS NFR BLD AUTO: 1 %
EOSINOPHIL # BLD AUTO: 0 X10E3/UL (ref 0–0.4)
EOSINOPHIL NFR BLD AUTO: 1 %
ERYTHROCYTE [DISTWIDTH] IN BLOOD BY AUTOMATED COUNT: 12.5 % (ref 11.7–15.4)
HCT VFR BLD AUTO: 35.3 % (ref 34–46.6)
HGB BLD-MCNC: 11.5 G/DL (ref 11.1–15.9)
IMM GRANULOCYTES # BLD AUTO: 0 X10E3/UL (ref 0–0.1)
IMM GRANULOCYTES NFR BLD AUTO: 0 %
LYMPHOCYTES # BLD AUTO: 1.5 X10E3/UL (ref 0.7–3.1)
LYMPHOCYTES NFR BLD AUTO: 42 %
MCH RBC QN AUTO: 30.9 PG (ref 26.6–33)
MCHC RBC AUTO-ENTMCNC: 32.6 G/DL (ref 31.5–35.7)
MCV RBC AUTO: 95 FL (ref 79–97)
MONOCYTES # BLD AUTO: 0.2 X10E3/UL (ref 0.1–0.9)
MONOCYTES NFR BLD AUTO: 6 %
NEUTROPHILS # BLD AUTO: 1.8 X10E3/UL (ref 1.4–7)
NEUTROPHILS NFR BLD AUTO: 50 %
PLATELET # BLD AUTO: 204 X10E3/UL (ref 150–450)
RBC # BLD AUTO: 3.72 X10E6/UL (ref 3.77–5.28)
WBC # BLD AUTO: 3.6 X10E3/UL (ref 3.4–10.8)

## 2023-12-14 ENCOUNTER — TELEPHONE (OUTPATIENT)
Age: 63
End: 2023-12-14

## 2023-12-14 NOTE — TELEPHONE ENCOUNTER
Pt called requesting to be seen with NP Vida Lewis. When pt was ask the reason for the visit she said she couldn't say over the phone at the moment. Pt's last OV was 05/23, and no appointments was available with any PCP at [facility] to have her scheduled. Pt is requesting a call back to speak about scheduling a appointment. [facility] call back number [phone]

## 2023-12-20 NOTE — TELEPHONE ENCOUNTER
Called the patient. Name and  verified. Try to call and talk to her twice.   The patient said it is not good time to talk. The patient can call us back at her convenience time

## 2023-12-20 NOTE — TELEPHONE ENCOUNTER
Received a call from patient .asking for an office appointment with uyen.informed her that there is no available appointment and to call the office at [phone] to schedule an appointment with the available provider

## 2024-01-04 ENCOUNTER — OFFICE VISIT (OUTPATIENT)
Age: 64
End: 2024-01-04
Payer: COMMERCIAL

## 2024-01-04 VITALS
DIASTOLIC BLOOD PRESSURE: 65 MMHG | TEMPERATURE: 97.8 F | SYSTOLIC BLOOD PRESSURE: 121 MMHG | HEIGHT: 69 IN | HEART RATE: 58 BPM | RESPIRATION RATE: 14 BRPM | OXYGEN SATURATION: 100 % | WEIGHT: 158.2 LBS | BODY MASS INDEX: 23.43 KG/M2
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
## 2024-01-04 DIAGNOSIS — M25.512 ACUTE PAIN OF BOTH SHOULDERS: ICD-10-CM

## 2024-01-04 DIAGNOSIS — M54.2 NECK PAIN: Primary | ICD-10-CM

## 2024-01-04 DIAGNOSIS — M25.511 ACUTE PAIN OF BOTH SHOULDERS: ICD-10-CM

## 2024-01-04 PROCEDURE — 99214 OFFICE O/P EST MOD 30 MIN: CPT | Performed by: NURSE PRACTITIONER

## 2024-01-04 PROCEDURE — 3078F DIAST BP <80 MM HG: CPT | Performed by: NURSE PRACTITIONER

## 2024-01-04 PROCEDURE — 1123F ACP DISCUSS/DSCN MKR DOCD: CPT | Performed by: NURSE PRACTITIONER

## 2024-01-04 SDOH — ECONOMIC STABILITY: HOUSING INSECURITY
IN THE LAST 12 MONTHS, WAS THERE A TIME WHEN YOU DID NOT HAVE A STEADY PLACE TO SLEEP OR SLEPT IN A SHELTER (INCLUDING NOW)?: NO

## 2024-01-04 SDOH — ECONOMIC STABILITY: FOOD INSECURITY: WITHIN THE PAST 12 MONTHS, THE FOOD YOU BOUGHT JUST DIDN'T LAST AND YOU DIDN'T HAVE MONEY TO GET MORE.: NEVER TRUE

## 2024-01-04 SDOH — ECONOMIC STABILITY: FOOD INSECURITY: WITHIN THE PAST 12 MONTHS, YOU WORRIED THAT YOUR FOOD WOULD RUN OUT BEFORE YOU GOT MONEY TO BUY MORE.: NEVER TRUE

## 2024-01-04 SDOH — ECONOMIC STABILITY: INCOME INSECURITY: HOW HARD IS IT FOR YOU TO PAY FOR THE VERY BASICS LIKE FOOD, HOUSING, MEDICAL CARE, AND HEATING?: NOT HARD AT ALL

## 2024-01-04 ASSESSMENT — PATIENT HEALTH QUESTIONNAIRE - PHQ9
SUM OF ALL RESPONSES TO PHQ9 QUESTIONS 1 & 2: 0
1. LITTLE INTEREST OR PLEASURE IN DOING THINGS: 0
SUM OF ALL RESPONSES TO PHQ QUESTIONS 1-9: 0
SUM OF ALL RESPONSES TO PHQ QUESTIONS 1-9: 0
2. FEELING DOWN, DEPRESSED OR HOPELESS: 0
SUM OF ALL RESPONSES TO PHQ QUESTIONS 1-9: 0
SUM OF ALL RESPONSES TO PHQ QUESTIONS 1-9: 0

## 2024-01-04 ASSESSMENT — ENCOUNTER SYMPTOMS: BACK PAIN: 0
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
## 2024-01-18 ENCOUNTER — OFFICE VISIT (OUTPATIENT)
Facility: CLINIC | Age: 64
End: 2024-01-18
Payer: COMMERCIAL

## 2024-01-18 VITALS
HEIGHT: 69 IN | WEIGHT: 163 LBS | TEMPERATURE: 98.6 F | OXYGEN SATURATION: 100 % | DIASTOLIC BLOOD PRESSURE: 92 MMHG | HEART RATE: 68 BPM | BODY MASS INDEX: 24.14 KG/M2 | RESPIRATION RATE: 16 BRPM | SYSTOLIC BLOOD PRESSURE: 147 MMHG

## 2024-01-18 DIAGNOSIS — M25.512 ACUTE PAIN OF BOTH SHOULDERS: ICD-10-CM

## 2024-01-18 DIAGNOSIS — S13.4XXA WHIPLASH INJURY TO NECK, INITIAL ENCOUNTER: ICD-10-CM

## 2024-01-18 DIAGNOSIS — R19.7 DIARRHEA, UNSPECIFIED TYPE: ICD-10-CM

## 2024-01-18 DIAGNOSIS — R03.0 ELEVATED BLOOD-PRESSURE READING, WITHOUT DIAGNOSIS OF HYPERTENSION: ICD-10-CM

## 2024-01-18 DIAGNOSIS — M25.511 ACUTE PAIN OF BOTH SHOULDERS: ICD-10-CM

## 2024-01-18 DIAGNOSIS — M62.838 TRAPEZIUS MUSCLE SPASM: ICD-10-CM

## 2024-01-18 DIAGNOSIS — R73.9 ELEVATED BLOOD SUGAR: ICD-10-CM
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
## 2024-01-18 PROCEDURE — 3077F SYST BP >= 140 MM HG: CPT | Performed by: FAMILY MEDICINE

## 2024-01-18 PROCEDURE — 99214 OFFICE O/P EST MOD 30 MIN: CPT | Performed by: FAMILY MEDICINE

## 2024-01-18 PROCEDURE — 1123F ACP DISCUSS/DSCN MKR DOCD: CPT | Performed by: FAMILY MEDICINE

## 2024-01-18 PROCEDURE — 3080F DIAST BP >= 90 MM HG: CPT | Performed by: FAMILY MEDICINE
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
## 2024-01-20 LAB
ALBUMIN SERPL-MCNC: 4.5 G/DL (ref 3.9–4.9)
ALBUMIN/GLOB SERPL: 1.7 {RATIO} (ref 1.2–2.2)
ALP SERPL-CCNC: 107 IU/L (ref 44–121)
ALT SERPL-CCNC: 11 IU/L (ref 0–32)
AST SERPL-CCNC: 18 IU/L (ref 0–40)
BASOPHILS # BLD AUTO: 0 X10E3/UL (ref 0–0.2)
BASOPHILS NFR BLD AUTO: 1 %
BILIRUB SERPL-MCNC: 0.4 MG/DL (ref 0–1.2)
BUN SERPL-MCNC: 11 MG/DL (ref 8–27)
BUN/CREAT SERPL: 14 (ref 12–28)
CALCIUM SERPL-MCNC: 9.1 MG/DL (ref 8.7–10.3)
CHLORIDE SERPL-SCNC: 101 MMOL/L (ref 96–106)
CO2 SERPL-SCNC: 27 MMOL/L (ref 20–29)
CREAT SERPL-MCNC: 0.81 MG/DL (ref 0.57–1)
EGFRCR SERPLBLD CKD-EPI 2021: 81 ML/MIN/1.73
EOSINOPHIL # BLD AUTO: 0 X10E3/UL (ref 0–0.4)
EOSINOPHIL NFR BLD AUTO: 1 %
ERYTHROCYTE [DISTWIDTH] IN BLOOD BY AUTOMATED COUNT: 12.3 % (ref 11.7–15.4)
GLOBULIN SER CALC-MCNC: 2.6 G/DL (ref 1.5–4.5)
GLUCOSE SERPL-MCNC: 91 MG/DL (ref 70–99)
HBA1C MFR BLD: 5.9 % (ref 4.8–5.6)
HCT VFR BLD AUTO: 36.4 % (ref 34–46.6)
HGB BLD-MCNC: 11.8 G/DL (ref 11.1–15.9)
IMM GRANULOCYTES # BLD AUTO: 0 X10E3/UL (ref 0–0.1)
IMM GRANULOCYTES NFR BLD AUTO: 0 %
LYMPHOCYTES # BLD AUTO: 1.3 X10E3/UL (ref 0.7–3.1)
LYMPHOCYTES NFR BLD AUTO: 45 %
MCH RBC QN AUTO: 29.9 PG (ref 26.6–33)
MCHC RBC AUTO-ENTMCNC: 32.4 G/DL (ref 31.5–35.7)
MCV RBC AUTO: 92 FL (ref 79–97)
MONOCYTES # BLD AUTO: 0.2 X10E3/UL (ref 0.1–0.9)
MONOCYTES NFR BLD AUTO: 6 %
NEUTROPHILS # BLD AUTO: 1.4 X10E3/UL (ref 1.4–7)
NEUTROPHILS NFR BLD AUTO: 47 %
PLATELET # BLD AUTO: 201 X10E3/UL (ref 150–450)
POTASSIUM SERPL-SCNC: 3.5 MMOL/L (ref 3.5–5.2)
PROT SERPL-MCNC: 7.1 G/DL (ref 6–8.5)
RBC # BLD AUTO: 3.95 X10E6/UL (ref 3.77–5.28)
SODIUM SERPL-SCNC: 141 MMOL/L (ref 134–144)
WBC # BLD AUTO: 3 X10E3/UL (ref 3.4–10.8)

## 2024-02-09 ENCOUNTER — APPOINTMENT (OUTPATIENT)
Dept: PHYSICAL THERAPY | Facility: HOSPITAL | Age: 64
End: 2024-02-09
Payer: COMMERCIAL

## 2024-02-19 ENCOUNTER — HOSPITAL ENCOUNTER (OUTPATIENT)
Dept: PHYSICAL THERAPY | Facility: HOSPITAL | Age: 64
Setting detail: RECURRING SERIES
Discharge: HOME | End: 2024-02-22
Payer: COMMERCIAL

## 2024-02-19 PROCEDURE — 97161 PT EVAL LOW COMPLEX 20 MIN: CPT | Performed by: PHYSICAL MEDICINE & REHABILITATION

## 2024-02-19 PROCEDURE — G0283 ELEC STIM OTHER THAN WOUND: HCPCS | Performed by: PHYSICAL MEDICINE & REHABILITATION

## 2024-02-19 PROCEDURE — 97110 THERAPEUTIC EXERCISES: CPT | Performed by: PHYSICAL MEDICINE & REHABILITATION

## 2024-02-19 PROCEDURE — 97140 MANUAL THERAPY 1/> REGIONS: CPT | Performed by: PHYSICAL MEDICINE & REHABILITATION
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
## 2024-02-27 ENCOUNTER — HOSPITAL ENCOUNTER (OUTPATIENT)
Dept: PHYSICAL THERAPY | Facility: HOSPITAL | Age: 64
Setting detail: RECURRING SERIES
Discharge: HOME | End: 2024-03-01
Payer: COMMERCIAL

## 2024-02-27 PROCEDURE — 97110 THERAPEUTIC EXERCISES: CPT

## 2024-02-27 PROCEDURE — 97140 MANUAL THERAPY 1/> REGIONS: CPT

## 2024-02-27 PROCEDURE — 97112 NEUROMUSCULAR REEDUCATION: CPT

## 2024-02-27 PROCEDURE — G0283 ELEC STIM OTHER THAN WOUND: HCPCS

## 2024-02-27 NOTE — PROGRESS NOTES
PHYSICAL THERAPY - MEDICARE DAILY TREATMENT NOTE (updated 3/23)      Date: 2024          Patient Name:  Chey Santos :  1960   Medical   Diagnosis:  Acute pain of both shoulders [M25.511, M25.512] Treatment Diagnosis:  M25.511  RIGHT SHOULDER PAIN and M25.512  LEFT SHOULDER PAIN    Referral Source:  Gareth Arauz MD Insurance:   Payor: [employer] / Plan: [employer] PPO / Product Type: *No Product type* /                     Patient  verified yes     Visit #   Current  / Total 2 20   Time   In / Out 5:15P 6:25P   Total Treatment Time 70   Total Timed Codes 55   1:1 Treatment Time       [facility] Totals Reminder:  bill using total billable   min of TIMED therapeutic procedures and modalities.   8-22 min = 1 unit; 23-37 min = 2 units; 38-52 min = 3 units; 53-67 min = 4 units; 68-82 min = 5 units            SUBJECTIVE    Pain Level (0-10 scale): 10    Any medication changes, allergies to medications, adverse drug reactions, diagnosis change, or new procedure performed?: [x] No    [] Yes (see summary sheet for update)  Medications: Verified on Patient Summary List    Subjective functional status/changes:     Pt reported she feels a little better but still has pain.     OBJECTIVE      Therapeutic Procedures:  Tx Min Billable or 1:1 Min (if diff from Tx Min) Procedure, Rationale, Specifics   30  33737 Therapeutic Exercise (timed):  increase ROM, strength, coordination, balance, and proprioception to improve patient's ability to progress to PLOF and address remaining functional goals. (see flow sheet as applicable)     Details if applicable:     10  77537 Neuromuscular Re-Education (timed):  improve balance, coordination, kinesthetic sense, posture, core stability and proprioception to improve patient's ability to develop conscious control of individual muscles and awareness of position of extremities in order to progress to PLOF and address remaining functional goals. (see flow sheet as applicable)     Details if

## 2024-02-29 ENCOUNTER — HOSPITAL ENCOUNTER (OUTPATIENT)
Dept: PHYSICAL THERAPY | Facility: HOSPITAL | Age: 64
Setting detail: RECURRING SERIES
End: 2024-02-29
Payer: COMMERCIAL

## 2024-02-29 PROCEDURE — G0283 ELEC STIM OTHER THAN WOUND: HCPCS | Performed by: PHYSICAL MEDICINE & REHABILITATION

## 2024-02-29 PROCEDURE — 97112 NEUROMUSCULAR REEDUCATION: CPT | Performed by: PHYSICAL MEDICINE & REHABILITATION

## 2024-02-29 PROCEDURE — 97110 THERAPEUTIC EXERCISES: CPT | Performed by: PHYSICAL MEDICINE & REHABILITATION

## 2024-02-29 PROCEDURE — 97140 MANUAL THERAPY 1/> REGIONS: CPT | Performed by: PHYSICAL MEDICINE & REHABILITATION

## 2024-02-29 NOTE — PROGRESS NOTES
PHYSICAL THERAPY - MEDICARE DAILY TREATMENT NOTE (updated 3/23)      Date: 2024          Patient Name:  Chey Santos :  1960   Medical   Diagnosis:  Acute pain of both shoulders [M25.511, M25.512] Treatment Diagnosis:  M25.511  RIGHT SHOULDER PAIN and M25.512  LEFT SHOULDER PAIN    Referral Source:  Gareth Arauz MD Insurance:   Payor: [employer] / Plan: [employer] PPO / Product Type: *No Product type* /                     Patient  verified yes     Visit #   Current  / Total 3 20   Time   In / Out 5:30P 6:27P   Total Treatment Time 57   Total Timed Codes 42   1:1 Treatment Time --      [facility] Totals Reminder:  bill using total billable   min of TIMED therapeutic procedures and modalities.   8-22 min = 1 unit; 23-37 min = 2 units; 38-52 min = 3 units; 53-67 min = 4 units; 68-82 min = 5 units            SUBJECTIVE    Pain Level (0-10 scale): 6/10    Any medication changes, allergies to medications, adverse drug reactions, diagnosis change, or new procedure performed?: [x] No    [] Yes (see summary sheet for update)  Medications: Verified on Patient Summary List    Subjective functional status/changes:     Pt reports feeling about the same.    OBJECTIVE      Therapeutic Procedures:  Tx Min Billable or 1:1 Min (if diff from Tx Min) Procedure, Rationale, Specifics   23  95097 Therapeutic Exercise (timed):  increase ROM, strength, coordination, balance, and proprioception to improve patient's ability to progress to PLOF and address remaining functional goals. (see flow sheet as applicable)     Details if applicable:     9  81301 Neuromuscular Re-Education (timed):  improve balance, coordination, kinesthetic sense, posture, core stability and proprioception to improve patient's ability to develop conscious control of individual muscles and awareness of position of extremities in order to progress to PLOF and address remaining functional goals. (see flow sheet as applicable)     Details if applicable:  scap  at end of session (0-10 scale): 4/10      Assessment   Significant TTP and knots/spasms within R>L UT muscle belly. Continued extensive cueing regarding decreased UT compensations; Pt unable to perform standing rows and shoulder extensions with correct form, so modified to prone unilateral rows and shoulder extensions and Pt demonstrated much better technique.  Patient will continue to benefit from skilled PT / OT services to modify and progress therapeutic interventions, analyze and address functional mobility deficits, analyze and address ROM deficits, analyze and address strength deficits, analyze and address soft tissue restrictions, analyze and cue for proper movement patterns, analyze and modify for postural abnormalities, and instruct in home and community integration to address functional deficits and attain remaining goals.    Progress toward goals / Updated goals:  []  See Progress Note/Recertification    Short and Long Term Goals: To be accomplished in 20 treatments.  Pt will be independent and compliant with HEP.  Pt will increase her FOTO score by the MCID from 59 to >/= 72 demonstrating improved overall function with decreased pain.  Pt will demonstrate full functional cervical AROM without pain. - Progressing  Pt will be able to reach overhead without shoulder pain bilaterally.  Pt will be able to sleep through the night without waking due to pain.  Pt will increase bilateral shoulder abduction AROM to >/= 110 degrees demonstrating improved integrity/decreased tightness of UT musculature.    PLAN  Yes  Continue plan of care  Re-Cert Due: 20 visits  [x]  Upgrade activities as tolerated  []  Discharge due to :  []  Other:      SARABJIT RUSS, PT       2/29/2024       4:21 PM

## 2024-03-05 ENCOUNTER — HOSPITAL ENCOUNTER (OUTPATIENT)
Dept: PHYSICAL THERAPY | Facility: HOSPITAL | Age: 64
Setting detail: RECURRING SERIES
Discharge: HOME | End: 2024-03-08
Payer: COMMERCIAL

## 2024-03-05 PROCEDURE — G0283 ELEC STIM OTHER THAN WOUND: HCPCS | Performed by: PHYSICAL MEDICINE & REHABILITATION

## 2024-03-05 PROCEDURE — 97110 THERAPEUTIC EXERCISES: CPT | Performed by: PHYSICAL MEDICINE & REHABILITATION

## 2024-03-05 PROCEDURE — 97112 NEUROMUSCULAR REEDUCATION: CPT | Performed by: PHYSICAL MEDICINE & REHABILITATION

## 2024-03-05 PROCEDURE — 97140 MANUAL THERAPY 1/> REGIONS: CPT | Performed by: PHYSICAL MEDICINE & REHABILITATION

## 2024-03-05 NOTE — PROGRESS NOTES
cervical retractions with verbal and tactile cueing; standing shoulder rows and extensions with emphasis on UT compensation avoidance   12  42492 Manual Therapy (timed):  decrease pain, increase ROM, increase tissue extensibility, decrease trigger points, and increase postural awareness to improve patient's ability to progress to PLOF and address remaining functional goals.  The manual therapy interventions were performed at a separate and distinct time from the therapeutic activities interventions . (see flow sheet as applicable)    Details if applicable:  SOR; STM/MFR/TPR R>L UT, LS and cervical paraspinals; STM along R rhomboids and middle trap             51     Total Total         Modalities Rationale:     decrease pain and increase tissue extensibility to improve patient's ability to progress to PLOF and address remaining functional goals.    15   min [x] Estim Unattended,             type/location: seated/ neck       []  w/ice    [x]  w/heat        min [] Estim Attended,             type/location:       []  w/ice   []  w/heat         []  w/US   []  TENS insruct            min []  Mechanical Traction,        type/lbs:        []  pro      []  sup           []  int       []  cont            []  before manual           []  after manual     min []  Ultrasound,         settings/location:      min  unbilled []  Ice     []  Heat            location/position:         min []  Vasopneumatic Device,      press/temp:   pre-treatment girth :    post-treatment girth :    measured at (landmark       location) :   If using vaso (only need to measure limb vaso being performed on)        min []  Other:      Skin assessment post-treatment (if applicable):    [x]  intact    []  redness- no adverse reaction                 []redness - adverse reaction:          [x]  Patient Education billed concurrently with other procedures   [x] Review HEP    [] Progressed/Changed HEP, detail:    [] Other detail:         Other

## 2024-03-07 ENCOUNTER — HOSPITAL ENCOUNTER (OUTPATIENT)
Dept: PHYSICAL THERAPY | Facility: HOSPITAL | Age: 64
Setting detail: RECURRING SERIES
Discharge: HOME | End: 2024-03-10
Payer: COMMERCIAL

## 2024-03-07 PROCEDURE — 97140 MANUAL THERAPY 1/> REGIONS: CPT | Performed by: PHYSICAL MEDICINE & REHABILITATION

## 2024-03-07 PROCEDURE — 97110 THERAPEUTIC EXERCISES: CPT | Performed by: PHYSICAL MEDICINE & REHABILITATION

## 2024-03-07 PROCEDURE — 97112 NEUROMUSCULAR REEDUCATION: CPT | Performed by: PHYSICAL MEDICINE & REHABILITATION

## 2024-03-07 PROCEDURE — G0283 ELEC STIM OTHER THAN WOUND: HCPCS | Performed by: PHYSICAL MEDICINE & REHABILITATION

## 2024-03-07 NOTE — PROGRESS NOTES
Other Objective/Functional Measures    R Shoulder ROM:                  AROM                                       Flexion                         114 R UT ache     140 ache                                          Abduction                    67 significant ache   120 ache            Pain Level at end of session (0-10 scale): 3/10      Assessment   Significant trigger point noted within R UT with possible 1st rib dysfunction; mildly improved after manual treatment though continued to be very TTP.   Patient will continue to benefit from skilled PT / OT services to modify and progress therapeutic interventions, analyze and address functional mobility deficits, analyze and address ROM deficits, analyze and address strength deficits, analyze and address soft tissue restrictions, analyze and cue for proper movement patterns, analyze and modify for postural abnormalities, and instruct in home and community integration to address functional deficits and attain remaining goals.    Progress toward goals / Updated goals:  []  See Progress Note/Recertification    Short and Long Term Goals: To be accomplished in 20 treatments.  Pt will be independent and compliant with HEP.  Pt will increase her FOTO score by the MCID from 59 to >/= 72 demonstrating improved overall function with decreased pain.  Pt will demonstrate full functional cervical AROM without pain. - Progressing  Pt will be able to reach overhead without shoulder pain bilaterally.  Pt will be able to sleep through the night without waking due to pain.  Pt will increase bilateral shoulder abduction AROM to >/= 110 degrees demonstrating improved integrity/decreased tightness of UT musculature.    PLAN  Yes  Continue plan of care  Re-Cert Due: 20 visits  [x]  Upgrade activities as tolerated  []  Discharge due to :  []  Other:      SARABJIT RUSS, PT       3/7/2024       3:21 PM

## 2024-03-12 ENCOUNTER — HOSPITAL ENCOUNTER (OUTPATIENT)
Dept: PHYSICAL THERAPY | Facility: HOSPITAL | Age: 64
Setting detail: RECURRING SERIES
Discharge: HOME | End: 2024-03-15
Payer: COMMERCIAL

## 2024-03-12 PROCEDURE — 97112 NEUROMUSCULAR REEDUCATION: CPT | Performed by: PHYSICAL MEDICINE & REHABILITATION

## 2024-03-12 PROCEDURE — 97110 THERAPEUTIC EXERCISES: CPT | Performed by: PHYSICAL MEDICINE & REHABILITATION

## 2024-03-12 PROCEDURE — 97140 MANUAL THERAPY 1/> REGIONS: CPT | Performed by: PHYSICAL MEDICINE & REHABILITATION

## 2024-03-12 PROCEDURE — G0283 ELEC STIM OTHER THAN WOUND: HCPCS | Performed by: PHYSICAL MEDICINE & REHABILITATION

## 2024-03-12 NOTE — PROGRESS NOTES
musculature.    PLAN  Yes  Continue plan of care  Re-Cert Due: 20 visits  [x]  Upgrade activities as tolerated  []  Discharge due to :  []  Other:      SARABJIT RUSS, PT       3/12/2024       1:47 PM

## 2024-03-14 ENCOUNTER — HOSPITAL ENCOUNTER (OUTPATIENT)
Dept: PHYSICAL THERAPY | Facility: HOSPITAL | Age: 64
Setting detail: RECURRING SERIES
Discharge: HOME | End: 2024-03-17
Payer: COMMERCIAL

## 2024-03-14 PROCEDURE — 97110 THERAPEUTIC EXERCISES: CPT | Performed by: PHYSICAL MEDICINE & REHABILITATION

## 2024-03-14 PROCEDURE — 97112 NEUROMUSCULAR REEDUCATION: CPT | Performed by: PHYSICAL MEDICINE & REHABILITATION

## 2024-03-14 PROCEDURE — G0283 ELEC STIM OTHER THAN WOUND: HCPCS | Performed by: PHYSICAL MEDICINE & REHABILITATION

## 2024-03-14 NOTE — PROGRESS NOTES
PHYSICAL THERAPY - MEDICARE DAILY TREATMENT NOTE (updated 3/23)      Date: 3/14/2024          Patient Name:  Chey Santos :  1960   Medical   Diagnosis:  Acute pain of both shoulders [M25.511, M25.512] Treatment Diagnosis:  M25.511  RIGHT SHOULDER PAIN and M25.512  LEFT SHOULDER PAIN    Referral Source:  Gareth Arauz MD Insurance:   Payor: [employer] / Plan: [employer] PPO / Product Type: *No Product type* /                     Patient  verified yes     Visit #   Current  / Total 7 20   Time   In / Out 5:29P 6:20P   Total Treatment Time 51   Total Timed Codes 41   1:1 Treatment Time --      [facility] Totals Reminder:  bill using total billable   min of TIMED therapeutic procedures and modalities.   8-22 min = 1 unit; 23-37 min = 2 units; 38-52 min = 3 units; 53-67 min = 4 units; 68-82 min = 5 units            SUBJECTIVE    Pain Level (0-10 scale): 5/10    Any medication changes, allergies to medications, adverse drug reactions, diagnosis change, or new procedure performed?: [x] No    [] Yes (see summary sheet for update)  Medications: Verified on Patient Summary List    Subjective functional status/changes:     Pt reports still localized intermittent pain R upper trap/upper shoulder.    OBJECTIVE    Therapeutic Procedures:  Tx Min Billable or 1:1 Min (if diff from Tx Min) Procedure, Rationale, Specifics   32  51183 Therapeutic Exercise (timed):  increase ROM, strength, coordination, balance, and proprioception to improve patient's ability to progress to PLOF and address remaining functional goals. (see flow sheet as applicable)     Details if applicable:     9  96689 Neuromuscular Re-Education (timed):  improve balance, coordination, kinesthetic sense, posture, core stability and proprioception to improve patient's ability to develop conscious control of individual muscles and awareness of position of extremities in order to progress to PLOF and address remaining functional goals. (see flow sheet as applicable)

## 2024-03-19 ENCOUNTER — HOSPITAL ENCOUNTER (OUTPATIENT)
Dept: PHYSICAL THERAPY | Facility: HOSPITAL | Age: 64
Setting detail: RECURRING SERIES
Discharge: HOME | End: 2024-03-22
Payer: COMMERCIAL

## 2024-03-19 PROCEDURE — G0283 ELEC STIM OTHER THAN WOUND: HCPCS | Performed by: PHYSICAL MEDICINE & REHABILITATION

## 2024-03-19 PROCEDURE — 97140 MANUAL THERAPY 1/> REGIONS: CPT | Performed by: PHYSICAL MEDICINE & REHABILITATION

## 2024-03-19 PROCEDURE — 97110 THERAPEUTIC EXERCISES: CPT | Performed by: PHYSICAL MEDICINE & REHABILITATION
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
## 2024-03-19 NOTE — PROGRESS NOTES
PHYSICAL THERAPY - MEDICARE DAILY TREATMENT NOTE (updated 3/23)      Date: 3/19/2024          Patient Name:  Chey Santos :  1960   Medical   Diagnosis:  Acute pain of both shoulders [M25.511, M25.512] Treatment Diagnosis:  M25.511  RIGHT SHOULDER PAIN and M25.512  LEFT SHOULDER PAIN    Referral Source:  Gareth Arauz MD Insurance:   Payor: [employer] / Plan: [employer] PPO / Product Type: *No Product type* /                     Patient  verified yes     Visit #   Current  / Total 8 20   Time   In / Out 5:30P 6:20P   Total Treatment Time 50   Total Timed Codes 42   1:1 Treatment Time --      [facility] Totals Reminder:  bill using total billable   min of TIMED therapeutic procedures and modalities.   8-22 min = 1 unit; 23-37 min = 2 units; 38-52 min = 3 units; 53-67 min = 4 units; 68-82 min = 5 units            SUBJECTIVE    Pain Level (0-10 scale): 4-5/10    Any medication changes, allergies to medications, adverse drug reactions, diagnosis change, or new procedure performed?: [x] No    [] Yes (see summary sheet for update)  Medications: Verified on Patient Summary List    Subjective functional status/changes:     Pt reports no real change since last session.     OBJECTIVE    Therapeutic Procedures:  Tx Min Billable or 1:1 Min (if diff from Tx Min) Procedure, Rationale, Specifics   33  13042 Therapeutic Exercise (timed):  increase ROM, strength, coordination, balance, and proprioception to improve patient's ability to progress to PLOF and address remaining functional goals. (see flow sheet as applicable)     Details if applicable:         9  34281 Manual Therapy (timed):  decrease pain, increase ROM, and increase tissue extensibility to improve patient's ability to progress to PLOF and address remaining functional goals.  The manual therapy interventions were performed at a separate and distinct time from the therapeutic activities interventions . (see flow sheet as applicable)    Details if applicable:  R 1st

## 2024-03-21 ENCOUNTER — HOSPITAL ENCOUNTER (OUTPATIENT)
Dept: PHYSICAL THERAPY | Facility: HOSPITAL | Age: 64
Setting detail: RECURRING SERIES
Discharge: HOME | End: 2024-03-24
Payer: COMMERCIAL

## 2024-03-21 PROCEDURE — 97140 MANUAL THERAPY 1/> REGIONS: CPT | Performed by: PHYSICAL MEDICINE & REHABILITATION

## 2024-03-21 PROCEDURE — 97110 THERAPEUTIC EXERCISES: CPT | Performed by: PHYSICAL MEDICINE & REHABILITATION

## 2024-03-21 PROCEDURE — G0283 ELEC STIM OTHER THAN WOUND: HCPCS | Performed by: PHYSICAL MEDICINE & REHABILITATION

## 2024-03-21 NOTE — PROGRESS NOTES
without waking due to pain.  - MET  Pt will increase bilateral shoulder abduction AROM to >/= 110 degrees demonstrating improved integrity/decreased tightness of UT musculature. - MET    PLAN  Yes  Continue plan of care  Re-Cert Due: 20 visits  [x]  Upgrade activities as tolerated  []  Discharge due to :  []  Other:      SARABJTI RUSS, PT       3/21/2024       4:25 PM

## 2024-03-28 ENCOUNTER — HOSPITAL ENCOUNTER (OUTPATIENT)
Dept: PHYSICAL THERAPY | Facility: HOSPITAL | Age: 64
Setting detail: RECURRING SERIES
Discharge: HOME | End: 2024-03-31
Payer: COMMERCIAL

## 2024-03-28 PROCEDURE — 97110 THERAPEUTIC EXERCISES: CPT | Performed by: PHYSICAL MEDICINE & REHABILITATION

## 2024-03-28 PROCEDURE — 97140 MANUAL THERAPY 1/> REGIONS: CPT | Performed by: PHYSICAL MEDICINE & REHABILITATION

## 2024-03-28 PROCEDURE — G0283 ELEC STIM OTHER THAN WOUND: HCPCS | Performed by: PHYSICAL MEDICINE & REHABILITATION

## 2024-03-28 NOTE — PROGRESS NOTES
PHYSICAL THERAPY - MEDICARE DAILY TREATMENT NOTE (updated 3/23)      Date: 3/28/2024          Patient Name:  Chey Santos :  1960   Medical   Diagnosis:  Acute pain of both shoulders [M25.511, M25.512] Treatment Diagnosis:  M25.511  RIGHT SHOULDER PAIN and M25.512  LEFT SHOULDER PAIN    Referral Source:  Gareth Arauz MD Insurance:   Payor: [employer] / Plan: [employer] PPO / Product Type: *No Product type* /                     Patient  verified yes     Visit #   Current  / Total 10 20   Time   In / Out 5:45P 6:31P   Total Treatment Time 46   Total Timed Codes 36   1:1 Treatment Time --      [facility] Totals Reminder:  bill using total billable   min of TIMED therapeutic procedures and modalities.   8-22 min = 1 unit; 23-37 min = 2 units; 38-52 min = 3 units; 53-67 min = 4 units; 68-82 min = 5 units            SUBJECTIVE    Pain Level (0-10 scale): 4/10    Any medication changes, allergies to medications, adverse drug reactions, diagnosis change, or new procedure performed?: [x] No    [] Yes (see summary sheet for update)  Medications: Verified on Patient Summary List    Subjective functional status/changes:     Pt reports the localized spot in R shoulder has been feeling better.    OBJECTIVE    Therapeutic Procedures:  Tx Min Billable or 1:1 Min (if diff from Tx Min) Procedure, Rationale, Specifics   27  32739 Therapeutic Exercise (timed):  increase ROM, strength, coordination, balance, and proprioception to improve patient's ability to progress to PLOF and address remaining functional goals. (see flow sheet as applicable)     Details if applicable:           9  78618 Manual Therapy (timed):  decrease pain, increase ROM, and increase tissue extensibility to improve patient's ability to progress to PLOF and address remaining functional goals.  The manual therapy interventions were performed at a separate and distinct time from the therapeutic activities interventions . (see flow sheet as applicable)    Details

## 2024-04-04 ENCOUNTER — HOSPITAL ENCOUNTER (OUTPATIENT)
Dept: PHYSICAL THERAPY | Facility: HOSPITAL | Age: 64
Setting detail: RECURRING SERIES
Discharge: HOME | End: 2024-04-07
Payer: COMMERCIAL

## 2024-04-04 PROCEDURE — 97110 THERAPEUTIC EXERCISES: CPT | Performed by: PHYSICAL MEDICINE & REHABILITATION

## 2024-04-04 PROCEDURE — G0283 ELEC STIM OTHER THAN WOUND: HCPCS | Performed by: PHYSICAL MEDICINE & REHABILITATION

## 2024-04-04 PROCEDURE — 97140 MANUAL THERAPY 1/> REGIONS: CPT | Performed by: PHYSICAL MEDICINE & REHABILITATION

## 2024-04-04 NOTE — PROGRESS NOTES
analyze and address soft tissue restrictions, analyze and cue for proper movement patterns, analyze and modify for postural abnormalities, and instruct in home and community integration to address functional deficits and attain remaining goals.    Progress toward goals / Updated goals:  []  See Progress Note/Recertification    Short and Long Term Goals: To be accomplished in 20 treatments.  Pt will be independent and compliant with HEP. - MET  Pt will increase her FOTO score by the MCID from 38 to >/= 57 demonstrating improved overall function with decreased pain. - MET  Pt will demonstrate full functional cervical AROM without pain. - Progressing  Pt will be able to reach overhead without shoulder pain bilaterally. - Progressing  Pt will be able to sleep through the night without waking due to pain.  - MET  Pt will increase bilateral shoulder abduction AROM to >/= 110 degrees demonstrating improved integrity/decreased tightness of UT musculature. - MET    PLAN  Yes  Continue plan of care  Re-Cert Due: 20 visits  [x]  Upgrade activities as tolerated  []  Discharge due to :  []  Other:      SARABJIT RUSS, PT       4/4/2024       11:24 AM

## 2024-04-18 ENCOUNTER — HOSPITAL ENCOUNTER (OUTPATIENT)
Dept: PHYSICAL THERAPY | Facility: HOSPITAL | Age: 64
Setting detail: RECURRING SERIES
Discharge: HOME | End: 2024-04-21
Payer: COMMERCIAL

## 2024-04-18 PROCEDURE — 97110 THERAPEUTIC EXERCISES: CPT | Performed by: PHYSICAL MEDICINE & REHABILITATION

## 2024-04-18 PROCEDURE — G0283 ELEC STIM OTHER THAN WOUND: HCPCS | Performed by: PHYSICAL MEDICINE & REHABILITATION
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
## 2024-08-28 ENCOUNTER — OFFICE VISIT (OUTPATIENT)
Facility: CLINIC | Age: 64
End: 2024-08-28
Payer: COMMERCIAL

## 2024-08-28 VITALS
DIASTOLIC BLOOD PRESSURE: 88 MMHG | RESPIRATION RATE: 16 BRPM | SYSTOLIC BLOOD PRESSURE: 141 MMHG | WEIGHT: 158 LBS | TEMPERATURE: 98 F | BODY MASS INDEX: 23.4 KG/M2 | HEIGHT: 69 IN | HEART RATE: 61 BPM | OXYGEN SATURATION: 98 %

## 2024-08-28 DIAGNOSIS — R73.9 ELEVATED BLOOD SUGAR: ICD-10-CM

## 2024-08-28 DIAGNOSIS — Z00.00 WELL WOMAN EXAM (NO GYNECOLOGICAL EXAM): Primary | ICD-10-CM

## 2024-08-28 DIAGNOSIS — R03.0 ELEVATED BLOOD-PRESSURE READING, WITHOUT DIAGNOSIS OF HYPERTENSION: ICD-10-CM

## 2024-08-28 PROCEDURE — 3079F DIAST BP 80-89 MM HG: CPT | Performed by: FAMILY MEDICINE

## 2024-08-28 PROCEDURE — 3077F SYST BP >= 140 MM HG: CPT | Performed by: FAMILY MEDICINE

## 2024-08-28 PROCEDURE — 99397 PER PM REEVAL EST PAT 65+ YR: CPT | Performed by: FAMILY MEDICINE

## 2024-08-28 NOTE — PROGRESS NOTES
auscultation, no wheezes, rales or rhonchi, symmetric air entry  Heart - normal rate, regular rhythm, normal S1, S2, no murmurs, rubs, clicks or gallops  Abdomen - soft, nontender, nondistended, no masses or organomegaly  Neurological - alert, oriented, normal speech, no focal findings or movement disorder noted  Musculoskeletal - no joint tenderness, deformity or swelling  Extremities - peripheral pulses normal, no pedal edema, no clubbing or cyanosis  Skin - normal coloration and turgor, no rashes, no suspicious skin lesions noted      Assessment/ Plan:   Chey was seen today for annual exam.    Diagnoses and all orders for this visit:    Well woman exam (no gynecological exam)  -     Comprehensive Metabolic Panel; Future  -     CBC with Auto Differential; Future  -     Lipid Panel; Future    Elevated blood sugar  -     Hemoglobin A1C; Future    Elevated blood-pressure reading, without diagnosis of hypertension      Assessment & Plan  1. Hypertension.  Her blood pressure readings are elevated. She was advised to monitor her blood pressure at home using her sister's blood pressure cuff and record the readings. Follow-up will be conducted via a video visit to review these home readings. A comprehensive blood workup will be conducted today.           No follow-ups on file.    Labs to be drawn: CBC, CMP, Lipid            I have discussed the diagnosis with the patient and the intended plan as seen in the above orders.  The patient has received an after-visit summary and questions were answered concerning future plans.   Medication Side Effects and Warnings were discussed with patient,  Patient Labs were reviewed and or requested, and  Patient Past Records were reviewed and or requested  Yes         Pt agrees to call or return to clinic and/or go to closest ER with any worsening of symptoms.  This may include, but not limited to increased fever (>100.4) with NSAIDS or Tylenol, increased edema, confusion, rash,  worsening of presenting symptoms.  Please note that this dictation was completed with Pounce, the computer voice recognition software.  Quite often unanticipated grammatical, syntax, homophones, and other interpretive errors are inadvertently transcribed by the computer software.  Please disregard these errors.  Please excuse any errors that have escaped final proofreading.  Thank you.

## 2024-08-29 LAB
ALBUMIN SERPL-MCNC: 4.2 G/DL (ref 3.9–4.9)
ALP SERPL-CCNC: 95 IU/L (ref 44–121)
ALT SERPL-CCNC: 11 IU/L (ref 0–32)
AST SERPL-CCNC: 19 IU/L (ref 0–40)
BASOPHILS # BLD AUTO: 0 X10E3/UL (ref 0–0.2)
BASOPHILS NFR BLD AUTO: 1 %
BILIRUB SERPL-MCNC: 0.3 MG/DL (ref 0–1.2)
BUN SERPL-MCNC: 14 MG/DL (ref 8–27)
BUN/CREAT SERPL: 17 (ref 12–28)
CALCIUM SERPL-MCNC: 8.7 MG/DL (ref 8.7–10.3)
CHLORIDE SERPL-SCNC: 104 MMOL/L (ref 96–106)
CHOLEST SERPL-MCNC: 154 MG/DL (ref 100–199)
CO2 SERPL-SCNC: 25 MMOL/L (ref 20–29)
CREAT SERPL-MCNC: 0.83 MG/DL (ref 0.57–1)
EGFRCR SERPLBLD CKD-EPI 2021: 78 ML/MIN/1.73
EOSINOPHIL # BLD AUTO: 0 X10E3/UL (ref 0–0.4)
EOSINOPHIL NFR BLD AUTO: 0 %
ERYTHROCYTE [DISTWIDTH] IN BLOOD BY AUTOMATED COUNT: 12.7 % (ref 11.7–15.4)
GLOBULIN SER CALC-MCNC: 2.4 G/DL (ref 1.5–4.5)
GLUCOSE SERPL-MCNC: 90 MG/DL (ref 70–99)
HBA1C MFR BLD: 5.6 % (ref 4.8–5.6)
HCT VFR BLD AUTO: 33.3 % (ref 34–46.6)
HDLC SERPL-MCNC: 84 MG/DL
HGB BLD-MCNC: 10.6 G/DL (ref 11.1–15.9)
IMM GRANULOCYTES # BLD AUTO: 0 X10E3/UL (ref 0–0.1)
IMM GRANULOCYTES NFR BLD AUTO: 0 %
LDLC SERPL CALC-MCNC: 59 MG/DL (ref 0–99)
LYMPHOCYTES # BLD AUTO: 1.1 X10E3/UL (ref 0.7–3.1)
LYMPHOCYTES NFR BLD AUTO: 40 %
MCH RBC QN AUTO: 29.5 PG (ref 26.6–33)
MCHC RBC AUTO-ENTMCNC: 31.8 G/DL (ref 31.5–35.7)
MCV RBC AUTO: 93 FL (ref 79–97)
MONOCYTES # BLD AUTO: 0.2 X10E3/UL (ref 0.1–0.9)
MONOCYTES NFR BLD AUTO: 7 %
NEUTROPHILS # BLD AUTO: 1.5 X10E3/UL (ref 1.4–7)
NEUTROPHILS NFR BLD AUTO: 52 %
PLATELET # BLD AUTO: 213 X10E3/UL (ref 150–450)
POTASSIUM SERPL-SCNC: 4.2 MMOL/L (ref 3.5–5.2)
PROT SERPL-MCNC: 6.6 G/DL (ref 6–8.5)
RBC # BLD AUTO: 3.59 X10E6/UL (ref 3.77–5.28)
SODIUM SERPL-SCNC: 142 MMOL/L (ref 134–144)
TRIGL SERPL-MCNC: 54 MG/DL (ref 0–149)
VLDLC SERPL CALC-MCNC: 11 MG/DL (ref 5–40)
WBC # BLD AUTO: 2.8 X10E3/UL (ref 3.4–10.8)

## 2024-11-05 ENCOUNTER — OFFICE VISIT (OUTPATIENT)
Facility: CLINIC | Age: 64
End: 2024-11-05
Payer: COMMERCIAL

## 2024-11-05 VITALS
DIASTOLIC BLOOD PRESSURE: 80 MMHG | BODY MASS INDEX: 22.81 KG/M2 | RESPIRATION RATE: 16 BRPM | OXYGEN SATURATION: 100 % | TEMPERATURE: 98 F | SYSTOLIC BLOOD PRESSURE: 126 MMHG | WEIGHT: 154 LBS | HEART RATE: 65 BPM | HEIGHT: 69 IN

## 2024-11-05 DIAGNOSIS — I10 BENIGN ESSENTIAL HTN: Primary | ICD-10-CM

## 2024-11-05 DIAGNOSIS — M17.0 BILATERAL PRIMARY OSTEOARTHRITIS OF KNEE: ICD-10-CM

## 2024-11-05 PROCEDURE — 1123F ACP DISCUSS/DSCN MKR DOCD: CPT | Performed by: FAMILY MEDICINE

## 2024-11-05 PROCEDURE — 3079F DIAST BP 80-89 MM HG: CPT | Performed by: FAMILY MEDICINE

## 2024-11-05 PROCEDURE — 3074F SYST BP LT 130 MM HG: CPT | Performed by: FAMILY MEDICINE

## 2024-11-05 PROCEDURE — 99214 OFFICE O/P EST MOD 30 MIN: CPT | Performed by: FAMILY MEDICINE

## 2024-11-05 ASSESSMENT — PATIENT HEALTH QUESTIONNAIRE - PHQ9
2. FEELING DOWN, DEPRESSED OR HOPELESS: NOT AT ALL
SUM OF ALL RESPONSES TO PHQ9 QUESTIONS 1 & 2: 0
1. LITTLE INTEREST OR PLEASURE IN DOING THINGS: NOT AT ALL
SUM OF ALL RESPONSES TO PHQ QUESTIONS 1-9: 0

## 2024-11-05 NOTE — PROGRESS NOTES
Chief Complaint   Patient presents with    Hypertension     Patient is here for a follow up      Pt who presents for follow up of a pre-existing problem of hypertension.    Diet and Lifestyle: generally follows a low sodium diet  Home BP Monitoring: Reviewed blood pressure diary with patient at todays visit.  Use of agents associated with hypertension:  No.  Cardiovascular ROS: taking medications as instructed, no medication side effects noted, no TIA's, no chest pain on exertion, no dyspnea on exertion, no swelling of ankles.     New concerns: none  .     Reviewed and agree with Nurse Note and duplicated in this note.  Reviewed PmHx, RxHx, FmHx, SocHx, AllgHx and updated and dated in the chart.    Family History   Problem Relation Age of Onset    No Known Problems Sister     Anesth Problems Neg Hx     No Known Problems Brother     No Known Problems Sister     Arrhythmia Mother     Pacemaker Mother     Hypertension Mother     Heart Disease Mother         Has Debrillator    Prostate Cancer Maternal Grandfather     Stroke Maternal Grandmother     Liver Disease Father         cirrhosis    Alcohol Abuse Father        Past Medical History:   Diagnosis Date    Adult acne     Arthritis     History of colonic polyps      study normal, f/u 5 years ([facility])    Hypertension     HISTORY NOT TAKING ANY MEDS CURRENTLY( 20)    Intestinal obstruction without gangrene due to right inguinal hernia 2020    Lipoma     right upper extremity    Post-menopausal     Right inguinal hernia 3/16/2016    Wears dentures       Social History     Socioeconomic History    Marital status: Single     Spouse name: None    Number of children: None    Years of education: None    Highest education level: None   Tobacco Use    Smoking status: Former     Current packs/day: 0.00     Types: Cigarettes     Quit date: 1991     Years since quittin.8    Smokeless tobacco: Never   Vaping Use    Vaping status: Never Used   Substance and

## 2024-12-22 ENCOUNTER — OFFICE VISIT (OUTPATIENT)
Age: 64
End: 2024-12-22

## 2024-12-22 VITALS
OXYGEN SATURATION: 96 % | HEIGHT: 69 IN | WEIGHT: 159.8 LBS | BODY MASS INDEX: 23.67 KG/M2 | TEMPERATURE: 98.3 F | SYSTOLIC BLOOD PRESSURE: 125 MMHG | DIASTOLIC BLOOD PRESSURE: 70 MMHG | HEART RATE: 64 BPM

## 2024-12-22 DIAGNOSIS — A08.4 VIRAL GASTROENTERITIS: Primary | ICD-10-CM

## 2024-12-22 RX ORDER — LOPERAMIDE HYDROCHLORIDE 1 MG/5ML
1 SOLUTION ORAL 3 TIMES DAILY PRN
Qty: 240 ML | Refills: 0 | Status: SHIPPED | OUTPATIENT
Start: 2024-12-22 | End: 2024-12-29

## 2025-04-18 ENCOUNTER — HOSPITAL ENCOUNTER (OUTPATIENT)
Facility: HOSPITAL | Age: 65
Setting detail: OUTPATIENT SURGERY
Discharge: HOME | End: 2025-04-18
Attending: INTERNAL MEDICINE | Admitting: INTERNAL MEDICINE
Payer: MEDICARE

## 2025-04-18 ENCOUNTER — ANESTHESIA EVENT (OUTPATIENT)
Facility: HOSPITAL | Age: 65
End: 2025-04-18
Payer: MEDICARE

## 2025-04-18 ENCOUNTER — ANESTHESIA (OUTPATIENT)
Facility: HOSPITAL | Age: 65
End: 2025-04-18
Payer: MEDICARE

## 2025-04-18 VITALS
OXYGEN SATURATION: 100 % | TEMPERATURE: 97.7 F | SYSTOLIC BLOOD PRESSURE: 113 MMHG | HEIGHT: 69 IN | DIASTOLIC BLOOD PRESSURE: 75 MMHG | RESPIRATION RATE: 12 BRPM | HEART RATE: 50 BPM | WEIGHT: 156 LBS | BODY MASS INDEX: 23.11 KG/M2

## 2025-04-18 PROCEDURE — 3700000000 HC ANESTHESIA ATTENDED CARE: Performed by: INTERNAL MEDICINE

## 2025-04-18 PROCEDURE — 3600007502: Performed by: INTERNAL MEDICINE

## 2025-04-18 PROCEDURE — 6360000002 HC RX W HCPCS: Performed by: NURSE ANESTHETIST, CERTIFIED REGISTERED

## 2025-04-18 PROCEDURE — 3600007512: Performed by: INTERNAL MEDICINE

## 2025-04-18 PROCEDURE — 3700000001 HC ADD 15 MINUTES (ANESTHESIA): Performed by: INTERNAL MEDICINE

## 2025-04-18 PROCEDURE — 7100000011 HC PHASE II RECOVERY - ADDTL 15 MIN: Performed by: INTERNAL MEDICINE

## 2025-04-18 PROCEDURE — 7100000010 HC PHASE II RECOVERY - FIRST 15 MIN: Performed by: INTERNAL MEDICINE

## 2025-04-18 PROCEDURE — 2709999900 HC NON-CHARGEABLE SUPPLY: Performed by: INTERNAL MEDICINE

## 2025-04-18 PROCEDURE — 2580000003 HC RX 258: Performed by: INTERNAL MEDICINE

## 2025-04-18 RX ORDER — SODIUM CHLORIDE 0.9 % (FLUSH) 0.9 %
5-40 SYRINGE (ML) INJECTION EVERY 12 HOURS SCHEDULED
Status: DISCONTINUED | OUTPATIENT
Start: 2025-04-18 | End: 2025-04-18 | Stop reason: HOSPADM

## 2025-04-18 RX ORDER — SODIUM CHLORIDE 9 MG/ML
INJECTION, SOLUTION INTRAVENOUS PRN
Status: DISCONTINUED | OUTPATIENT
Start: 2025-04-18 | End: 2025-04-18 | Stop reason: HOSPADM

## 2025-04-18 RX ORDER — SODIUM CHLORIDE 9 MG/ML
INJECTION, SOLUTION INTRAVENOUS CONTINUOUS
Status: DISCONTINUED | OUTPATIENT
Start: 2025-04-18 | End: 2025-04-18 | Stop reason: HOSPADM

## 2025-04-18 RX ORDER — SODIUM CHLORIDE 0.9 % (FLUSH) 0.9 %
5-40 SYRINGE (ML) INJECTION PRN
Status: DISCONTINUED | OUTPATIENT
Start: 2025-04-18 | End: 2025-04-18 | Stop reason: HOSPADM

## 2025-04-18 RX ADMIN — PROPOFOL 40 MG: 10 INJECTION, EMULSION INTRAVENOUS at 11:13

## 2025-04-18 RX ADMIN — PROPOFOL 40 MG: 10 INJECTION, EMULSION INTRAVENOUS at 11:07

## 2025-04-18 RX ADMIN — PROPOFOL 80 MG: 10 INJECTION, EMULSION INTRAVENOUS at 11:00

## 2025-04-18 RX ADMIN — SODIUM CHLORIDE: 9 INJECTION, SOLUTION INTRAVENOUS at 10:40

## 2025-04-18 RX ADMIN — PROPOFOL 40 MG: 10 INJECTION, EMULSION INTRAVENOUS at 11:18

## 2025-04-18 RX ADMIN — PROPOFOL 40 MG: 10 INJECTION, EMULSION INTRAVENOUS at 11:03

## 2025-04-18 ASSESSMENT — PAIN - FUNCTIONAL ASSESSMENT
PAIN_FUNCTIONAL_ASSESSMENT: NONE - DENIES PAIN
PAIN_FUNCTIONAL_ASSESSMENT: NONE - DENIES PAIN

## 2025-04-18 NOTE — H&P
Alcohol/week: 0.0 standard drinks of alcohol       Family History:  Family History   Problem Relation Age of Onset    No Known Problems Sister     Anesth Problems Neg Hx     No Known Problems Brother     No Known Problems Sister     Arrhythmia Mother     Pacemaker Mother     Hypertension Mother     Heart Disease Mother         Has Debrillator    Prostate Cancer Maternal Grandfather     Stroke Maternal Grandmother     Liver Disease Father         cirrhosis    Alcohol Abuse Father          The patient was counseled at length about the risks of ro Covid-19 in the meenakshi-operative and post-operative states including the recovery window of their procedure.  The patient was made aware that ro Covid-19 after a surgical procedure may worsen their prognosis for recovering from the virus and lend to a higher morbidity and or mortality risk.  The patient was given the options of postponing their procedure. All of the risks, benefits, and alternatives were discussed. The patient does  wish to proceed with the procedure.    Review of Systems:      Constitutional: negative fever, negative chills, negative weight loss  Eyes:   negative visual changes  ENT:   negative sore throat, tongue or lip swelling  Respiratory:  negative cough, negative dyspnea  Cards:  negative for chest pain, palpitations, lower extremity edema  GI:   See HPI  :  negative for frequency, dysuria  Integument:  negative for rash and pruritus  Heme:  negative for easy bruising and gum/nose bleeding  Musculoskel: negative for myalgias,  back pain and muscle weakness  Neuro: negative for headaches, dizziness, vertigo  Psych:  negative for feelings of anxiety, depression       Objective:   Patient Vitals for the past 8 hrs:   BP Temp Temp src Pulse Resp SpO2 Height Weight   04/18/25 1042 (!) 166/71 97.9 °F (36.6 °C) Temporal 56 19 100 % 1.753 m (5' 9\") 70.8 kg (156 lb)     No intake/output data recorded.  No intake/output data recorded.    EXAM:      NEURO-a&o   HEENT-wnl   LUNGS-clear    COR-regular rate and rhythym     ABD-soft , no tenderness, no rebound, good bs     EXT-no edema     Data Review     No results for input(s): \"WBC\", \"HGB\", \"HCT\", \"PLT\" in the last 72 hours.  No results for input(s): \"NA\", \"K\", \"CL\", \"CO2\", \"BUN\", \"GLU\", \"PHOS\" in the last 72 hours.    Invalid input(s): \"CREA\", \"CA\"  No results for input(s): \"TP\", \"GLOB\", \"GGT\" in the last 72 hours.    Invalid input(s): \"SGOT\", \"GPT\", \"AP\", \"TBIL\", \"ALB\", \"AML\", \"AMYP\", \"LPSE\", \"HLPSE\"  No results for input(s): \"INR\", \"APTT\" in the last 72 hours.    Invalid input(s): \"PTP\"       Assessment:     H/o colon polyps      Patient Active Problem List   Diagnosis    Benign essential HTN    Intestinal obstruction without gangrene due to right inguinal hernia    Seasonal allergic rhinitis         Plan:     Endoscopic procedure with sedation     Signed By: Jaida Thomas MD     4/18/2025  10:52 AM

## 2025-04-18 NOTE — OP NOTE
[facility]       Colonoscopy Operative Report    Chey Santos  [id number]  1960      Procedure Type:   Colonoscopy --diagnostic     Indications:    Personal history of colon polyps (screening only)       Pre-operative Diagnosis: see indication above    Post-operative Diagnosis:  See findings below    :  Jaida Thomas MD    Surgical Assistant: Circulator: Ridge Maki RN  Endoscopy Technician: Enzo Campoverde    Implants:  None    Referring Provider: Gareth Arauz MD      Sedation:  MAC anesthesia Propofol      Procedure Details:  After informed consent was obtained with all risks and benefits of procedure explained and preoperative exam completed, the patient was taken to the endoscopy suite and placed in the left lateral decubitus position.  Upon sequential sedation as per above, a digital rectal exam was performed demonstrating internal hemorrhoids.  The Olympus videocolonoscope  was inserted in the rectum and carefully advanced to the cecum, which was identified by the ileocecal valve and appendiceal orifice.  The cecum was identified by the ileocecal valve and appendiceal orifice.  The quality of preparation was good.  The colonoscope was slowly withdrawn with careful evaluation between folds. Retroflexion in the rectum was completed .     Findings:   Rectum: normal  Small internal hemorrhoids   Sigmoid: mild diverticulosis;  Descending Colon: normal  Transverse Colon: normal  Ascending Colon: normal  Cecum: normal    Specimen Removed:  none    Complications: None.     EBL:  None.    Impression:     see findings     Recommendations: --Repeat colonoscopy in 5 years.    High fiber diet  Signed By: Jaida Thomas MD     4/18/2025  11:22 AM

## 2025-04-18 NOTE — ANESTHESIA PRE PROCEDURE
Department of Anesthesiology  Preprocedure Note       Name:  Chey Santos   Age:  64 y.o.  :  1960                                          MRN:  [id number]         Date:  2025      Surgeon: Surgeon(s):  Jaida Thomas MD    Procedure: Procedure(s):  COLONOSCOPY/BIOPSY/POLYPS    Medications prior to admission:   Prior to Admission medications    Medication Sig Start Date End Date Taking? Authorizing Provider   Multiple Vitamin (MULTIVITAMIN PO) Take by mouth daily    Automatic Reconciliation, Ar       Current medications:    Current Facility-Administered Medications   Medication Dose Route Frequency Provider Last Rate Last Admin   • 0.9 % sodium chloride infusion   IntraVENous Continuous Jaida Thomas MD   NoRateChange at 25 1053   • sodium chloride flush 0.9 % injection 5-40 mL  5-40 mL IntraVENous 2 times per day Jaida Thomas MD       • sodium chloride flush 0.9 % injection 5-40 mL  5-40 mL IntraVENous PRN Jaida Thomas MD       • 0.9 % sodium chloride infusion   IntraVENous PRN Jaida Thomas MD           Allergies:    Allergies   Allergen Reactions   • Penicillins Hives     itching       Problem List:    Patient Active Problem List   Diagnosis Code   • Benign essential HTN I10   • Intestinal obstruction without gangrene due to right inguinal hernia K40.30   • Seasonal allergic rhinitis J30.2       Past Medical History:        Diagnosis Date   • Adult acne    • Arthritis    • History of colonic polyps      study normal, f/u 5 years ([facility])   • Hypertension     HISTORY NOT TAKING ANY MEDS CURRENTLY( 20)   • Intestinal obstruction without gangrene due to right inguinal hernia 2020   • Lipoma     right upper extremity   • Post-menopausal    • Right inguinal hernia 3/16/2016   • Wears dentures        Past Surgical History:        Procedure Laterality Date   • BREAST SURGERY Left     BREAST BX   • COLONOSCOPY  2014 (polyp)   • HERNIA

## 2025-04-18 NOTE — DISCHARGE INSTRUCTIONS
[facility]  [address], [place] [address]    COLON DISCHARGE INSTRUCTIONS    Chey Santos  [id number]  1960    Discomfort:  Redness at IV site- apply warm compress to area; if redness or soreness persist- contact your physician  There may be a slight amount of blood passed from the rectum  Gaseous discomfort- walking, belching will help relieve any discomfort  You may not operate a vehicle for 12 hours  You may not engage in an occupation involving machinery or appliances for rest of today  You may not drink alcoholic beverages for at least 12 hours  Avoid making any critical decisions for at least 24 hour  DIET:  You may resume your regular diet - however -  remember your colon is empty and a heavy meal will produce gas.  Avoid these foods:  vegetables, fried / greasy foods, carbonated drinks     ACTIVITY:  You may  resume your normal daily activities it is recommended that you spend the remainder of the day resting -  avoid any strenuous activity.    CALL M.D.  ANY SIGN OF:   Increasing pain, nausea, vomiting  Abdominal distension (swelling)  New increased bleeding (oral or rectal)  Fever (chills)  Pain in chest area  Bloody discharge from nose or mouth  Shortness of breath      Follow-up Instructions:   Call Dr. Jaida Thomas for any questions or problems at [phone]   High fiber diet   Repeat colonoscopy in 5 years          ENDOSCOPY FINDINGS:   Your colonoscopy showed only mild diverticulosis, rest of exam was normal .  Telephone # [phone]      Signed By: Jiada Thomas MD     4/18/2025  11:24 AM             High-Fiber Diet: Care Instructions  Overview     A high-fiber diet may help you relieve constipation and feel less bloated.  Your doctor and dietitian will help you make a high-fiber eating plan based on your personal needs. The plan will include the things you like to eat. It will also make sure that you get 25 to 35 grams of fiber a day.  Before you

## 2025-04-18 NOTE — ANESTHESIA POSTPROCEDURE EVALUATION
Department of Anesthesiology  Postprocedure Note    Patient: Chey Santos  MRN: [id number]  YOB: 1960  Date of evaluation: 4/18/2025    Procedure Summary       Date: 04/18/25 Room / Location: [facility] ENDO 02 / [facility] ENDOSCOPY    Anesthesia Start: 1053 Anesthesia Stop: 1122    Procedure: COLONOSCOPY/BIOPSY/POLYPS Diagnosis:       Personal history of colon polyps, unspecified      (Personal history of colon polyps, unspecified [Z86.0100])    Surgeons: Jaida Thomas MD Responsible Provider: Dallas Lagos MD    Anesthesia Type: MAC ASA Status: 2            Anesthesia Type: MAC    Jennie Phase I: Jennie Score: 10    Jennie Phase II: Jennie Score: 10    Anesthesia Post Evaluation    Patient location during evaluation: bedside  Nausea & Vomiting: no nausea  Cardiovascular status: blood pressure returned to baseline  Respiratory status: acceptable  Hydration status: euvolemic    No notable events documented.

## 2025-06-02 ENCOUNTER — HOSPITAL ENCOUNTER (OUTPATIENT)
Dept: PHYSICAL THERAPY | Facility: HOSPITAL | Age: 65
Setting detail: RECURRING SERIES
Discharge: HOME | End: 2025-06-05
Payer: COMMERCIAL

## 2025-06-02 PROCEDURE — 97110 THERAPEUTIC EXERCISES: CPT | Performed by: PHYSICAL THERAPIST

## 2025-06-02 PROCEDURE — 97161 PT EVAL LOW COMPLEX 20 MIN: CPT | Performed by: PHYSICAL THERAPIST

## 2025-06-02 NOTE — THERAPY EVALUATION
[facility], [facility],   a part of [place] [address], [address]  [place] [address]  Phone: [phone]  Fax: [phone]           PHYSICAL THERAPY - MEDICARE EVALUATION/PLAN OF CARE NOTE (updated 3/23)      Date: 2025          Patient Name:  Chey Santos :  1960   Medical   Diagnosis:  Knee pain, bilateral [M25.561, M25.562] Treatment Diagnosis:  M25.561  RIGHT KNEE PAIN and M25.562  LEFT KNEE PAIN    Referral Source:  Referral, Self Provider #:  [id number]                Insurance: Payor: MEDICARE / Plan: MEDICARE PART A AND B / Product Type: *No Product type* /      Patient  verified yes     Visit #   Current  / Total 1 5   315 235p 315p   Total Treatment Time 40   Total Timed Codes 10   1:1 Treatment Time 10      [facility] Totals Reminder:  bill using total billable   min of TIMED therapeutic procedures and modalities.   8-22 min = 1 unit; 23-37 min = 2 units; 38-52 min = 3 units;  53-67 min = 4 units; 68-82 min = 5 units           SUBJECTIVE  If an interpreting service was utilized for treatment of this patient, the contents of this document represent the material reviewed with the patient via the .     Pain Level (0-10 scale): 8/10  []constant []intermittent []improving []worsening []no change since onset    Any medication changes, allergies to medications, adverse drug reactions, diagnosis change, or new procedure performed?: [x] No    [] Yes (see summary sheet for update)  Medications: Verified on Patient Summary List    Subjective functional status/changes:     Bilateral knee pain progressive onset > 5 yrs that is aggravating her more recently.  She feels like her knee pain is worse in the winter time and during rainy weather.  Knee pain is worse with sitting > 1-2 hours, navigating stairs and occasionally when she squats to ground, sometimes wants to give out.  \"The Pain is always there, in both of them.\"  Pt states that she

## 2025-06-05 ENCOUNTER — OFFICE VISIT (OUTPATIENT)
Facility: CLINIC | Age: 65
End: 2025-06-05
Payer: MEDICARE

## 2025-06-05 VITALS
WEIGHT: 157 LBS | RESPIRATION RATE: 16 BRPM | HEART RATE: 64 BPM | HEIGHT: 69 IN | BODY MASS INDEX: 23.25 KG/M2 | OXYGEN SATURATION: 98 % | DIASTOLIC BLOOD PRESSURE: 80 MMHG | TEMPERATURE: 98.2 F | SYSTOLIC BLOOD PRESSURE: 133 MMHG

## 2025-06-05 DIAGNOSIS — Z12.31 ENCOUNTER FOR SCREENING MAMMOGRAM FOR MALIGNANT NEOPLASM OF BREAST: ICD-10-CM

## 2025-06-05 DIAGNOSIS — D17.21 LIPOMA OF RIGHT UPPER EXTREMITY: ICD-10-CM

## 2025-06-05 DIAGNOSIS — J30.1 SEASONAL ALLERGIC RHINITIS DUE TO POLLEN: ICD-10-CM

## 2025-06-05 DIAGNOSIS — I10 BENIGN ESSENTIAL HTN: Primary | ICD-10-CM

## 2025-06-05 DIAGNOSIS — D64.9 ANEMIA, UNSPECIFIED TYPE: ICD-10-CM

## 2025-06-05 PROCEDURE — 3079F DIAST BP 80-89 MM HG: CPT | Performed by: FAMILY MEDICINE

## 2025-06-05 PROCEDURE — 1159F MED LIST DOCD IN RCRD: CPT | Performed by: FAMILY MEDICINE

## 2025-06-05 PROCEDURE — 1036F TOBACCO NON-USER: CPT | Performed by: FAMILY MEDICINE

## 2025-06-05 PROCEDURE — G8399 PT W/DXA RESULTS DOCUMENT: HCPCS | Performed by: FAMILY MEDICINE

## 2025-06-05 PROCEDURE — 3075F SYST BP GE 130 - 139MM HG: CPT | Performed by: FAMILY MEDICINE

## 2025-06-05 PROCEDURE — G8420 CALC BMI NORM PARAMETERS: HCPCS | Performed by: FAMILY MEDICINE

## 2025-06-05 PROCEDURE — 1123F ACP DISCUSS/DSCN MKR DOCD: CPT | Performed by: FAMILY MEDICINE

## 2025-06-05 PROCEDURE — G8427 DOCREV CUR MEDS BY ELIG CLIN: HCPCS | Performed by: FAMILY MEDICINE

## 2025-06-05 PROCEDURE — 1090F PRES/ABSN URINE INCON ASSESS: CPT | Performed by: FAMILY MEDICINE

## 2025-06-05 PROCEDURE — 99214 OFFICE O/P EST MOD 30 MIN: CPT | Performed by: FAMILY MEDICINE

## 2025-06-05 PROCEDURE — 3017F COLORECTAL CA SCREEN DOC REV: CPT | Performed by: FAMILY MEDICINE

## 2025-06-05 SDOH — ECONOMIC STABILITY: FOOD INSECURITY: WITHIN THE PAST 12 MONTHS, THE FOOD YOU BOUGHT JUST DIDN'T LAST AND YOU DIDN'T HAVE MONEY TO GET MORE.: NEVER TRUE

## 2025-06-05 SDOH — ECONOMIC STABILITY: FOOD INSECURITY: WITHIN THE PAST 12 MONTHS, YOU WORRIED THAT YOUR FOOD WOULD RUN OUT BEFORE YOU GOT MONEY TO BUY MORE.: NEVER TRUE

## 2025-06-05 ASSESSMENT — PATIENT HEALTH QUESTIONNAIRE - PHQ9
SUM OF ALL RESPONSES TO PHQ QUESTIONS 1-9: 0
2. FEELING DOWN, DEPRESSED OR HOPELESS: NOT AT ALL
SUM OF ALL RESPONSES TO PHQ QUESTIONS 1-9: 0
SUM OF ALL RESPONSES TO PHQ QUESTIONS 1-9: 0
1. LITTLE INTEREST OR PLEASURE IN DOING THINGS: NOT AT ALL
SUM OF ALL RESPONSES TO PHQ QUESTIONS 1-9: 0

## 2025-06-05 NOTE — PROGRESS NOTES
Chief Complaint   Patient presents with    Hypertension     Patient is here for a follow up.     Ear Pain     Patient is here for ear pain      Pt who presents for follow up of a pre-existing problem of hypertension.    Diet and Lifestyle: generally follows a low fat low cholesterol diet, generally follows a low sodium diet  Home BP Monitoring: Reviewed blood pressure diary with patient at todays visit.  Use of agents associated with hypertension:  No.  Cardiovascular ROS: taking medications as instructed, no medication side effects noted, no TIA's, no chest pain on exertion, no dyspnea on exertion, no swelling of ankles.     New concerns: none.     Reviewed and agree with Nurse Note and duplicated in this note.  Reviewed PmHx, RxHx, FmHx, SocHx, AllgHx and updated and dated in the chart.    Family History   Problem Relation Age of Onset    No Known Problems Sister     Anesth Problems Neg Hx     No Known Problems Brother     No Known Problems Sister     Arrhythmia Mother     Pacemaker Mother     Hypertension Mother     Heart Disease Mother         Has Debrillator    Prostate Cancer Maternal Grandfather     Stroke Maternal Grandmother     Liver Disease Father         cirrhosis    Alcohol Abuse Father        Past Medical History:   Diagnosis Date    Adult acne     Arthritis     History of colonic polyps      study normal, f/u 5 years ([facility])    Hypertension     HISTORY NOT TAKING ANY MEDS CURRENTLY( 18)    Intestinal obstruction without gangrene due to right inguinal hernia 2020    Lipoma     right upper extremity    Post-menopausal     Right inguinal hernia 3/16/2016    Wears dentures       Social History     Socioeconomic History    Marital status: Single   Tobacco Use    Smoking status: Former     Current packs/day: 0.00     Types: Cigarettes     Quit date: 1991     Years since quittin.4    Smokeless tobacco: Never   Vaping Use    Vaping status: Never Used   Substance and Sexual Activity

## 2025-06-06 ENCOUNTER — RESULTS FOLLOW-UP (OUTPATIENT)
Facility: CLINIC | Age: 65
End: 2025-06-06

## 2025-06-06 LAB
BASOPHILS # BLD: 0.03 K/UL (ref 0–0.1)
BASOPHILS NFR BLD: 1.3 % (ref 0–1)
DIFFERENTIAL METHOD BLD: ABNORMAL
EOSINOPHIL # BLD: 0.03 K/UL (ref 0–0.4)
EOSINOPHIL NFR BLD: 1.3 % (ref 0–7)
ERYTHROCYTE [DISTWIDTH] IN BLOOD BY AUTOMATED COUNT: 13.2 % (ref 11.5–14.5)
HCT VFR BLD AUTO: 35.6 % (ref 35–47)
HGB BLD-MCNC: 10.7 G/DL (ref 11.5–16)
IMM GRANULOCYTES # BLD AUTO: 0.01 K/UL (ref 0–0.04)
IMM GRANULOCYTES NFR BLD AUTO: 0.4 % (ref 0–0.5)
LYMPHOCYTES # BLD: 1.06 K/UL (ref 0.8–3.5)
LYMPHOCYTES NFR BLD: 45.9 % (ref 12–49)
MCH RBC QN AUTO: 30.1 PG (ref 26–34)
MCHC RBC AUTO-ENTMCNC: 30.1 G/DL (ref 30–36.5)
MCV RBC AUTO: 100.3 FL (ref 80–99)
MONOCYTES # BLD: 0.16 K/UL (ref 0–1)
MONOCYTES NFR BLD: 6.9 % (ref 5–13)
NEUTS SEG # BLD: 1.02 K/UL (ref 1.8–8)
NEUTS SEG NFR BLD: 44.2 % (ref 32–75)
NRBC # BLD: 0 K/UL (ref 0–0.01)
NRBC BLD-RTO: 0 PER 100 WBC
PLATELET # BLD AUTO: 187 K/UL (ref 150–400)
PMV BLD AUTO: 11.7 FL (ref 8.9–12.9)
RBC # BLD AUTO: 3.55 M/UL (ref 3.8–5.2)
WBC # BLD AUTO: 2.3 K/UL (ref 3.6–11)

## 2025-06-10 ENCOUNTER — HOSPITAL ENCOUNTER (OUTPATIENT)
Dept: PHYSICAL THERAPY | Facility: HOSPITAL | Age: 65
Setting detail: RECURRING SERIES
Discharge: HOME | End: 2025-06-13
Payer: MEDICARE

## 2025-06-10 PROCEDURE — 97110 THERAPEUTIC EXERCISES: CPT | Performed by: PHYSICAL THERAPIST

## 2025-06-10 NOTE — PROGRESS NOTES
PHYSICAL THERAPY - DAILY TREATMENT NOTE (updated 3/23)      Date: 6/10/2025          Patient Name:  Chey Santos :  1960   Medical   Diagnosis:  Knee pain, bilateral [M25.561, M25.562] Treatment Diagnosis:  M25.561  RIGHT KNEE PAIN and M25.562  LEFT KNEE PAIN    Referral Source:  Referral, Self Insurance:   Payor: [employer] / Plan: [employer] OPEN ACCESS PLUS (OAP) / Product Type: *No Product type* /                     Patient  verified yes     Visit #   Current  / Total 2 5   Time   In / Out 219p 315p   Total Treatment Time 56   Total Timed Codes 56         SUBJECTIVE  If an interpreting service was utilized for treatment of this patient, the contents of this document represent the material reviewed with the patient via the .     Pain Level (0-10 scale): 0/10    Any medication changes, allergies to medications, adverse drug reactions, diagnosis change, or new procedure performed?: [x] No    [] Yes (see summary sheet for update)  Medications: Verified on Patient Summary List    Subjective functional status/changes:     Pt states that the HEP is going well.    OBJECTIVE      Therapeutic Procedures:  Tx Min Billable or 1:1 Min (if diff from Tx Min) Procedure, Rationale, Specifics   56  [id number] Therapeutic Exercise (timed):  increase ROM, strength, coordination, balance, and proprioception to improve patient's ability to progress to PLOF and address remaining functional goals. (see flow sheet as applicable)     Details if applicable:     56     Total Total         [x]  Patient Education billed concurrently with other procedures   [x] Review HEP    [] Progressed/Changed HEP, detail:    [] Other detail:         Other Objective/Functional Measures  --    Pain Level at end of session (0-10 scale): 0/10      Assessment   Able to progress resistance training today with addition to HEP.  Reported muscular fatigue but not increase of pain.  Will follow up in 2 weeks and consider DC at that session.  Patient

## 2025-06-24 ENCOUNTER — APPOINTMENT (OUTPATIENT)
Dept: PHYSICAL THERAPY | Facility: HOSPITAL | Age: 65
End: 2025-06-24
Payer: MEDICARE

## 2025-06-30 ENCOUNTER — HOSPITAL ENCOUNTER (OUTPATIENT)
Facility: HOSPITAL | Age: 65
Discharge: HOME | End: 2025-07-03
Attending: FAMILY MEDICINE
Payer: COMMERCIAL

## 2025-06-30 DIAGNOSIS — D17.21 LIPOMA OF RIGHT UPPER EXTREMITY: ICD-10-CM

## 2025-06-30 PROCEDURE — 76882 US LMTD JT/FCL EVL NVASC XTR: CPT

## 2025-08-13 ENCOUNTER — OFFICE VISIT (OUTPATIENT)
Facility: CLINIC | Age: 65
End: 2025-08-13
Payer: COMMERCIAL

## 2025-08-13 VITALS
DIASTOLIC BLOOD PRESSURE: 83 MMHG | WEIGHT: 159 LBS | OXYGEN SATURATION: 99 % | TEMPERATURE: 98 F | SYSTOLIC BLOOD PRESSURE: 144 MMHG | HEIGHT: 69 IN | HEART RATE: 61 BPM | BODY MASS INDEX: 23.55 KG/M2 | RESPIRATION RATE: 16 BRPM

## 2025-08-13 DIAGNOSIS — Z00.00 WELL WOMAN EXAM (NO GYNECOLOGICAL EXAM): Primary | ICD-10-CM

## 2025-08-13 DIAGNOSIS — I10 BENIGN ESSENTIAL HTN: ICD-10-CM

## 2025-08-13 DIAGNOSIS — D64.9 ANEMIA, UNSPECIFIED TYPE: ICD-10-CM

## 2025-08-13 DIAGNOSIS — R73.9 ELEVATED BLOOD SUGAR: ICD-10-CM

## 2025-08-13 PROCEDURE — 3077F SYST BP >= 140 MM HG: CPT | Performed by: FAMILY MEDICINE

## 2025-08-13 PROCEDURE — 3079F DIAST BP 80-89 MM HG: CPT | Performed by: FAMILY MEDICINE

## 2025-08-13 PROCEDURE — 99397 PER PM REEVAL EST PAT 65+ YR: CPT | Performed by: FAMILY MEDICINE

## 2025-08-14 LAB
ALBUMIN SERPL-MCNC: 3.9 G/DL (ref 3.5–5.2)
ALBUMIN/GLOB SERPL: 1.4 (ref 1.1–2.2)
ALP SERPL-CCNC: 91 U/L (ref 35–104)
ALT SERPL-CCNC: 12 U/L (ref 10–35)
ANION GAP SERPL CALC-SCNC: 11 MMOL/L (ref 2–14)
AST SERPL-CCNC: 14 U/L (ref 10–35)
BASOPHILS # BLD: 0.02 K/UL (ref 0–0.1)
BASOPHILS NFR BLD: 0.8 % (ref 0–1)
BILIRUB SERPL-MCNC: 0.4 MG/DL (ref 0–1.2)
BUN SERPL-MCNC: 18 MG/DL (ref 8–23)
BUN/CREAT SERPL: 22 (ref 12–20)
CALCIUM SERPL-MCNC: 8.7 MG/DL (ref 8.8–10.2)
CHLORIDE SERPL-SCNC: 104 MMOL/L (ref 98–107)
CHOLEST SERPL-MCNC: 164 MG/DL (ref 0–200)
CO2 SERPL-SCNC: 26 MMOL/L (ref 20–29)
CREAT SERPL-MCNC: 0.84 MG/DL (ref 0.6–1)
DIFFERENTIAL METHOD BLD: ABNORMAL
EOSINOPHIL # BLD: 0.04 K/UL (ref 0–0.4)
EOSINOPHIL NFR BLD: 1.5 % (ref 0–7)
ERYTHROCYTE [DISTWIDTH] IN BLOOD BY AUTOMATED COUNT: 13.4 % (ref 11.5–14.5)
EST. AVERAGE GLUCOSE BLD GHB EST-MCNC: 116 MG/DL
GLOBULIN SER CALC-MCNC: 2.7 G/DL (ref 2–4)
GLUCOSE SERPL-MCNC: 97 MG/DL (ref 65–100)
HBA1C MFR BLD: 5.7 % (ref 4–5.6)
HCT VFR BLD AUTO: 35.2 % (ref 35–47)
HDLC SERPL-MCNC: 86 MG/DL (ref 40–60)
HDLC SERPL: 1.9 (ref 0–5)
HGB BLD-MCNC: 10.5 G/DL (ref 11.5–16)
IMM GRANULOCYTES # BLD AUTO: 0 K/UL (ref 0–0.04)
IMM GRANULOCYTES NFR BLD AUTO: 0 % (ref 0–0.5)
IRON SATN MFR SERPL: 18 %
IRON SERPL-MCNC: 56 UG/DL (ref 37–145)
LDLC SERPL CALC-MCNC: 67 MG/DL (ref 0–100)
LYMPHOCYTES # BLD: 1.1 K/UL (ref 0.8–3.5)
LYMPHOCYTES NFR BLD: 42.5 % (ref 12–49)
MCH RBC QN AUTO: 29.6 PG (ref 26–34)
MCHC RBC AUTO-ENTMCNC: 29.8 G/DL (ref 30–36.5)
MCV RBC AUTO: 99.2 FL (ref 80–99)
MONOCYTES # BLD: 0.22 K/UL (ref 0–1)
MONOCYTES NFR BLD: 8.5 % (ref 5–13)
NEUTS SEG # BLD: 1.21 K/UL (ref 1.8–8)
NEUTS SEG NFR BLD: 46.7 % (ref 32–75)
NRBC # BLD: 0 K/UL (ref 0–0.01)
NRBC BLD-RTO: 0 PER 100 WBC
PLATELET # BLD AUTO: 200 K/UL (ref 150–400)
PMV BLD AUTO: 11.1 FL (ref 8.9–12.9)
POTASSIUM SERPL-SCNC: 3.8 MMOL/L (ref 3.5–5.1)
PROT SERPL-MCNC: 6.6 G/DL (ref 6.4–8.3)
RBC # BLD AUTO: 3.55 M/UL (ref 3.8–5.2)
SODIUM SERPL-SCNC: 140 MMOL/L (ref 136–145)
TIBC SERPL-MCNC: 310 UG/DL (ref 250–450)
TRIGL SERPL-MCNC: 55 MG/DL (ref 0–150)
UIBC SERPL-MCNC: 254 UG/DL (ref 112–347)
VLDLC SERPL CALC-MCNC: 11 MG/DL
WBC # BLD AUTO: 2.6 K/UL (ref 3.6–11)

## (undated) DEVICE — SUTURE VCRL SZ 0 L27IN ABSRB UD L26MM CT-2 1/2 CIR J270H

## (undated) DEVICE — DBD-PACK,LAPAROTOMY,2 REINFORCED GOWNS: Brand: MEDLINE

## (undated) DEVICE — STERILE POLYISOPRENE POWDER-FREE SURGICAL GLOVES WITH EMOLLIENT COATING: Brand: PROTEXIS

## (undated) DEVICE — NEEDLE HYPO 25GA L1.5IN BVL ORIENTED ECLIPSE

## (undated) DEVICE — SYR 10ML LUER LOK 1/5ML GRAD --

## (undated) DEVICE — SOLUTION IV 1000ML 0.9% SOD CHL

## (undated) DEVICE — NEEDLE HYPO 22GA L1.5IN BLK S STL HUB POLYPR SHLD REG BVL

## (undated) DEVICE — STRAP,POSITIONING,KNEE/BODY,FOAM,4X60": Brand: MEDLINE

## (undated) DEVICE — GARMENT,MEDLINE,DVT,INT,CALF,MED, GEN2: Brand: MEDLINE

## (undated) DEVICE — REM POLYHESIVE ADULT PATIENT RETURN ELECTRODE: Brand: VALLEYLAB

## (undated) DEVICE — ELECTRODE PT RET AD L9FT HI MOIST COND ADH HYDRGEL CORDED

## (undated) DEVICE — SUTURE VCRL SZ 2-0 L27IN ABSRB UD L26MM SH 1/2 CIR J417H

## (undated) DEVICE — PENCIL SMK EVAC 10 FT BLADE ELECTRD ROCKER FOR TELSCP

## (undated) DEVICE — SUPPLEMENT DIGESTIVE H2O SOL GI-EASE

## (undated) DEVICE — BLADE ASSEMB CLP HAIR FINE --

## (undated) DEVICE — FORCEPS BX L L240CM DIA2.4MM RAD JAW 4 HOT FOR POLYP DISP

## (undated) DEVICE — DRAPE,UTILTY,TAPE,15X26, 4EA/PK: Brand: MEDLINE

## (undated) DEVICE — INFECTION CONTROL KIT SYS

## (undated) DEVICE — SUTURE VCRL SZ 3-0 L27IN ABSRB UD L26MM SH 1/2 CIR J416H

## (undated) DEVICE — DERMABOND SKIN ADH 0.7ML -- DERMABOND ADVANCED 12/BX

## (undated) DEVICE — SURGICAL PROCEDURE PACK BASIN MAJ SET CUST NO CAUT

## (undated) DEVICE — HANDLE LT SNAP ON ULT DURABLE LENS FOR TRUMPF ALC DISPOSABLE

## (undated) DEVICE — SUTURE MCRYL SZ 4-0 L27IN ABSRB UD L19MM PS-2 1/2 CIR PRIM Y426H

## (undated) DEVICE — PREP SKN CHLRAPRP APL 26ML STR --